# Patient Record
Sex: FEMALE | Race: BLACK OR AFRICAN AMERICAN | NOT HISPANIC OR LATINO | Employment: FULL TIME | URBAN - METROPOLITAN AREA
[De-identification: names, ages, dates, MRNs, and addresses within clinical notes are randomized per-mention and may not be internally consistent; named-entity substitution may affect disease eponyms.]

---

## 2023-05-05 LAB
ESTIMATED AVERAGE GLUCOSE FOR HBA1C: 140 MG/DL
HEMOGLOBIN A1C/HEMOGLOBIN TOTAL IN BLOOD: 6.5 %

## 2023-06-08 LAB
ABO GROUP (TYPE) IN BLOOD: NORMAL
ANTIBODY SCREEN: NORMAL
ERYTHROCYTE DISTRIBUTION WIDTH (RATIO) BY AUTOMATED COUNT: 13.4 % (ref 11.5–14.5)
ERYTHROCYTE MEAN CORPUSCULAR HEMOGLOBIN CONCENTRATION (G/DL) BY AUTOMATED: 31.5 G/DL (ref 32–36)
ERYTHROCYTE MEAN CORPUSCULAR VOLUME (FL) BY AUTOMATED COUNT: 85 FL (ref 80–100)
ERYTHROCYTES (10*6/UL) IN BLOOD BY AUTOMATED COUNT: 4.43 X10E12/L (ref 4–5.2)
HEMATOCRIT (%) IN BLOOD BY AUTOMATED COUNT: 37.8 % (ref 36–46)
HEMOGLOBIN (G/DL) IN BLOOD: 11.9 G/DL (ref 12–16)
LEUKOCYTES (10*3/UL) IN BLOOD BY AUTOMATED COUNT: 12 X10E9/L (ref 4.4–11.3)
NRBC (PER 100 WBCS) BY AUTOMATED COUNT: 0 /100 WBC (ref 0–0)
PLATELETS (10*3/UL) IN BLOOD AUTOMATED COUNT: 242 X10E9/L (ref 150–450)
REFLEX ADDED, ANEMIA PANEL: ABNORMAL
RH FACTOR: NORMAL
SYPHILIS TOTAL AB: NONREACTIVE

## 2023-07-13 LAB
ABO GROUP (TYPE) IN BLOOD: NORMAL
ALANINE AMINOTRANSFERASE (SGPT) (U/L) IN SER/PLAS: 7 U/L (ref 7–45)
ALBUMIN (G/DL) IN SER/PLAS: 4 G/DL (ref 3.4–5)
ALKALINE PHOSPHATASE (U/L) IN SER/PLAS: 113 U/L (ref 33–110)
ANION GAP IN SER/PLAS: 15 MMOL/L (ref 10–20)
ANTIBODY SCREEN: NORMAL
ASPARTATE AMINOTRANSFERASE (SGOT) (U/L) IN SER/PLAS: 9 U/L (ref 9–39)
BILIRUBIN TOTAL (MG/DL) IN SER/PLAS: 0.3 MG/DL (ref 0–1.2)
CALCIUM (MG/DL) IN SER/PLAS: 9.2 MG/DL (ref 8.6–10.6)
CARBON DIOXIDE, TOTAL (MMOL/L) IN SER/PLAS: 21 MMOL/L (ref 21–32)
CHLORIDE (MMOL/L) IN SER/PLAS: 104 MMOL/L (ref 98–107)
CREATININE (MG/DL) IN SER/PLAS: 0.58 MG/DL (ref 0.5–1.05)
GFR FEMALE: >90 ML/MIN/1.73M2
GLUCOSE (MG/DL) IN SER/PLAS: 115 MG/DL (ref 74–99)
POTASSIUM (MMOL/L) IN SER/PLAS: 4.1 MMOL/L (ref 3.5–5.3)
PROTEIN TOTAL: 6.9 G/DL (ref 6.4–8.2)
RH FACTOR: NORMAL
SODIUM (MMOL/L) IN SER/PLAS: 136 MMOL/L (ref 136–145)
UREA NITROGEN (MG/DL) IN SER/PLAS: 10 MG/DL (ref 6–23)

## 2023-07-14 LAB
ESTIMATED AVERAGE GLUCOSE FOR HBA1C: 169 MG/DL
HEMOGLOBIN A1C/HEMOGLOBIN TOTAL IN BLOOD: 7.5 %

## 2023-07-16 LAB
BB ANTIBODY IDENTIFICATION: NORMAL
GROUP B STREP SCREEN: ABNORMAL

## 2023-07-17 LAB — PATH REV-IMMUNOHEMATOLOGY-PR30: NORMAL

## 2023-08-23 ENCOUNTER — HOSPITAL ENCOUNTER (OUTPATIENT)
Dept: DATA CONVERSION | Facility: HOSPITAL | Age: 33
End: 2023-08-23
Attending: STUDENT IN AN ORGANIZED HEALTH CARE EDUCATION/TRAINING PROGRAM
Payer: COMMERCIAL

## 2023-08-23 DIAGNOSIS — R29.810 FACIAL WEAKNESS: ICD-10-CM

## 2023-08-23 DIAGNOSIS — E11.9 TYPE 2 DIABETES MELLITUS WITHOUT COMPLICATIONS (MULTI): ICD-10-CM

## 2023-08-23 DIAGNOSIS — Z79.84 LONG TERM (CURRENT) USE OF ORAL HYPOGLYCEMIC DRUGS: ICD-10-CM

## 2023-08-23 DIAGNOSIS — Z79.899 OTHER LONG TERM (CURRENT) DRUG THERAPY: ICD-10-CM

## 2023-08-23 DIAGNOSIS — G51.0 BELL'S PALSY: ICD-10-CM

## 2023-08-23 LAB
ALANINE AMINOTRANSFERASE (SGPT) (U/L) IN SER/PLAS: 20 U/L (ref 7–45)
ALBUMIN (G/DL) IN SER/PLAS: 4 G/DL (ref 3.4–5)
ALKALINE PHOSPHATASE (U/L) IN SER/PLAS: 107 U/L (ref 33–110)
ANION GAP IN SER/PLAS: 16 MMOL/L (ref 10–20)
ASPARTATE AMINOTRANSFERASE (SGOT) (U/L) IN SER/PLAS: 15 U/L (ref 9–39)
BILIRUBIN TOTAL (MG/DL) IN SER/PLAS: 0.3 MG/DL (ref 0–1.2)
CALCIUM (MG/DL) IN SER/PLAS: 9.2 MG/DL (ref 8.6–10.6)
CARBON DIOXIDE, TOTAL (MMOL/L) IN SER/PLAS: 22 MMOL/L (ref 21–32)
CHLORIDE (MMOL/L) IN SER/PLAS: 104 MMOL/L (ref 98–107)
CREATININE (MG/DL) IN SER/PLAS: 0.56 MG/DL (ref 0.5–1.05)
ERYTHROCYTE DISTRIBUTION WIDTH (RATIO) BY AUTOMATED COUNT: 15.6 % (ref 11.5–14.5)
ERYTHROCYTE MEAN CORPUSCULAR HEMOGLOBIN CONCENTRATION (G/DL) BY AUTOMATED: 30.4 G/DL (ref 32–36)
ERYTHROCYTE MEAN CORPUSCULAR VOLUME (FL) BY AUTOMATED COUNT: 85 FL (ref 80–100)
ERYTHROCYTES (10*6/UL) IN BLOOD BY AUTOMATED COUNT: 4.22 X10E12/L (ref 4–5.2)
GFR FEMALE: >90 ML/MIN/1.73M2
GLUCOSE (MG/DL) IN SER/PLAS: 170 MG/DL (ref 74–99)
HEMATOCRIT (%) IN BLOOD BY AUTOMATED COUNT: 35.9 % (ref 36–46)
HEMOGLOBIN (G/DL) IN BLOOD: 10.9 G/DL (ref 12–16)
LACTATE DEHYDROGENASE (U/L) IN SER/PLAS BY LAC->PYR RXN: 213 U/L (ref 84–246)
LEUKOCYTES (10*3/UL) IN BLOOD BY AUTOMATED COUNT: 11.7 X10E9/L (ref 4.4–11.3)
NRBC (PER 100 WBCS) BY AUTOMATED COUNT: 0 /100 WBC (ref 0–0)
PLATELETS (10*3/UL) IN BLOOD AUTOMATED COUNT: 300 X10E9/L (ref 150–450)
POCT GLUCOSE: 202 MG/DL (ref 74–99)
POTASSIUM (MMOL/L) IN SER/PLAS: 3.9 MMOL/L (ref 3.5–5.3)
PROTEIN TOTAL: 7.2 G/DL (ref 6.4–8.2)
SODIUM (MMOL/L) IN SER/PLAS: 138 MMOL/L (ref 136–145)
URATE (MG/DL) IN SER/PLAS: 4.7 MG/DL (ref 2.3–6.7)
UREA NITROGEN (MG/DL) IN SER/PLAS: 8 MG/DL (ref 6–23)

## 2023-08-29 LAB
B. BURGDORFERI VLSE1/PEPC10 ABS, ELISA: 0.95 IV
BORRELIA BURGDORFERI AB, IGG BY ELISA: 0.17 IV
BORRELIA BURGDORFERI AB, IGM BY ELISA: 0.34 IV
LYME MODIFIED 2-TIER TESTING INTERPRETATION: NEGATIVE

## 2023-09-28 PROBLEM — B37.31 VAGINAL YEAST INFECTION: Status: ACTIVE | Noted: 2023-09-28

## 2023-09-28 PROBLEM — N92.6 MISSED PERIOD: Status: ACTIVE | Noted: 2023-09-28

## 2023-09-28 PROBLEM — O35.9XX0 SUSPECTED FETAL ANOMALY, ANTEPARTUM (HHS-HCC): Status: ACTIVE | Noted: 2023-09-28

## 2023-09-28 PROBLEM — R03.0 ELEVATED BLOOD PRESSURE READING: Status: ACTIVE | Noted: 2023-09-28

## 2023-09-28 PROBLEM — R03.0 BLOOD PRESSURE ELEVATED WITHOUT HISTORY OF HTN: Status: ACTIVE | Noted: 2023-09-28

## 2023-09-28 PROBLEM — O24.119 PRE-EXISTING TYPE 2 DIABETES MELLITUS DURING PREGNANCY, ANTEPARTUM (HHS-HCC): Status: ACTIVE | Noted: 2023-09-28

## 2023-09-28 PROBLEM — R87.619 ABNORMAL PAP SMEAR OF CERVIX: Status: ACTIVE | Noted: 2023-09-28

## 2023-09-28 PROBLEM — O10.919 CHRONIC HYPERTENSION IN PREGNANCY (HHS-HCC): Status: ACTIVE | Noted: 2023-09-28

## 2023-09-28 PROBLEM — O10.919 MATERNAL CHRONIC HYPERTENSION (HHS-HCC): Status: ACTIVE | Noted: 2023-09-28

## 2023-09-28 PROBLEM — I10 BENIGN ESSENTIAL HYPERTENSION: Status: ACTIVE | Noted: 2023-09-28

## 2023-09-28 PROBLEM — H52.13 MYOPIA OF BOTH EYES: Status: ACTIVE | Noted: 2023-09-28

## 2023-09-28 PROBLEM — O24.912: Status: ACTIVE | Noted: 2023-09-28

## 2023-09-28 PROBLEM — R10.32 LEFT LOWER QUADRANT PAIN: Status: ACTIVE | Noted: 2023-09-28

## 2023-09-28 PROBLEM — G51.0 BELL'S PALSY: Status: ACTIVE | Noted: 2023-09-28

## 2023-09-28 PROBLEM — N89.8 VAGINAL DISCHARGE: Status: ACTIVE | Noted: 2023-09-28

## 2023-09-28 PROBLEM — J45.909 ASTHMA (HHS-HCC): Status: ACTIVE | Noted: 2023-09-28

## 2023-09-28 PROBLEM — O09.90 HIGH RISK PREGNANCY, ANTEPARTUM (HHS-HCC): Status: ACTIVE | Noted: 2023-09-28

## 2023-09-28 PROBLEM — L73.9 FOLLICULITIS: Status: ACTIVE | Noted: 2023-09-28

## 2023-09-28 PROBLEM — M67.40 GANGLION: Status: ACTIVE | Noted: 2023-09-28

## 2023-09-28 PROBLEM — O35.BXX0 ABNORMAL FETAL ECHOCARDIOGRAM AFFECTING ANTEPARTUM CARE OF MOTHER (HHS-HCC): Status: ACTIVE | Noted: 2023-09-28

## 2023-09-28 PROBLEM — D50.9 IRON DEFICIENCY ANEMIA: Status: ACTIVE | Noted: 2023-09-28

## 2023-09-28 RX ORDER — NIFEDIPINE 60 MG/1
TABLET, EXTENDED RELEASE ORAL
COMMUNITY
Start: 2023-08-18 | End: 2023-12-20

## 2023-09-28 RX ORDER — HYDROCHLOROTHIAZIDE 25 MG/1
1 TABLET ORAL DAILY
COMMUNITY
Start: 2021-11-17 | End: 2023-12-20 | Stop reason: ALTCHOICE

## 2023-09-28 RX ORDER — METFORMIN HYDROCHLORIDE 500 MG/1
TABLET ORAL
COMMUNITY
Start: 2023-08-18 | End: 2023-12-20

## 2023-09-28 RX ORDER — IBUPROFEN 600 MG/1
TABLET ORAL
COMMUNITY
Start: 2020-09-14 | End: 2023-11-27

## 2023-09-28 RX ORDER — INSULIN GLARGINE 100 [IU]/ML
12 INJECTION, SOLUTION SUBCUTANEOUS 2 TIMES DAILY
COMMUNITY
End: 2024-05-21 | Stop reason: SDUPTHER

## 2023-09-28 RX ORDER — PRENATAL VIT,CAL 76/IRON/FOLIC 29 MG-1 MG
1 TABLET ORAL DAILY
COMMUNITY
Start: 2023-01-24 | End: 2023-12-20

## 2023-09-28 RX ORDER — NAPROXEN SODIUM 220 MG/1
TABLET, FILM COATED ORAL
COMMUNITY
Start: 2023-02-01 | End: 2023-12-20

## 2023-09-28 RX ORDER — PNV NO.95/FERROUS FUM/FOLIC AC 28MG-0.8MG
TABLET ORAL
COMMUNITY
Start: 2023-01-24 | End: 2023-12-20

## 2023-09-28 RX ORDER — INSULIN LISPRO 100 [IU]/ML
INJECTION, SOLUTION INTRAVENOUS; SUBCUTANEOUS
COMMUNITY
End: 2023-12-20

## 2023-09-28 RX ORDER — PYRIDOXINE HCL (VITAMIN B6) 25 MG
TABLET ORAL EVERY 8 HOURS
COMMUNITY
Start: 2023-02-22 | End: 2023-12-20

## 2023-09-28 RX ORDER — DIPHENHYDRAMINE HCL 25 MG
CAPSULE ORAL
COMMUNITY
Start: 2023-02-22 | End: 2023-12-20

## 2023-09-28 RX ORDER — IBUPROFEN 200 MG
CAPSULE ORAL
COMMUNITY
Start: 2020-02-13

## 2023-09-28 RX ORDER — FERROUS SULFATE 325(65) MG
1 TABLET ORAL DAILY
COMMUNITY
Start: 2023-09-21 | End: 2023-12-20

## 2023-09-28 RX ORDER — NIFEDIPINE 30 MG/1
1 TABLET, FILM COATED, EXTENDED RELEASE ORAL DAILY
COMMUNITY
Start: 2020-09-14 | End: 2023-12-20

## 2023-09-28 RX ORDER — LANCETS 28 GAUGE
EACH MISCELLANEOUS
COMMUNITY
Start: 2023-07-16

## 2023-09-28 RX ORDER — ACETAMINOPHEN 325 MG/1
TABLET ORAL
COMMUNITY
Start: 2023-08-18 | End: 2023-11-27

## 2023-09-28 RX ORDER — PREDNISONE 50 MG/1
TABLET ORAL
COMMUNITY
Start: 2023-08-23 | End: 2023-12-20

## 2023-09-29 VITALS — WEIGHT: 216.27 LBS | HEIGHT: 65 IN | BODY MASS INDEX: 36.03 KG/M2

## 2023-09-30 NOTE — PROGRESS NOTES
Current Stage:   Stage: Triage     Subjective Data:   Post Partum:  Ambulate: Yes   Flatus: Yes   Tolerate Diet: Yes   Lochia: Light   Desired Contraception: declined - Contraception  options reviewed, including risks and benefits.   Postpartum:    31 yo now , PPD7 s/p  at 37.3 , presents with elevated BP at home and left facial paralysis.    Pt reports a headache last night for which she took two baby ASA. Her BP this am was 162/101 and she noticed left facial numbness that she first noticed around 8 am. She was up with the baby several times through the night and did not notice the facial  numbness at 4 am which is the last time she was up until 8 am.  She denies HA, scotoma, RUQ pain, chest pain, SOB, any change in gait, difficulty swallowing. Other than left ear pain for the past few days, she denies any other sick symptoms and no sick  contacts. She took her Nifed 60 last night between 7-8 pm. She denies any increased pain or bleeding from delivery.     Pregnancy complicated by:  - cHTN, on Nifed 60, P:C 0.06, HELLP labs negx1  - h/o sPEC with IOL at 35wga  - T2DM, A1c 7.5 on . normal fetal echo  - Asthma in childhood, no albuterol use or exacerbations in adulthood  - GBS pos  - Rh neg, s/p rhogam at 28 wks  - Rubella nonimmune, will need MMR postpartum  - FGR. EFW 2442g 9%, AC 19% on 8/10      OBHx:  2020: 35.4wga IOL for SPEC.  5#13  2014: term . GDM  GynHx:   pap  ASC-US HPV+  colpo  WNL  last pap 3/2019 WNL  2022 LSIL +OTHER  PMH: anxiety, no meds  PSH: denies  Meds: Nifedipine 60 daily, Metformin 1000 BID  All: NKDA  FamHx: DM- mother. States mother had CVA in pregnancy. MGM - HTN  SocHx: denies t/e/d. Lives with  and children        Objective Information:    Objective Information:      T   P  R  BP   MAP  SpO2   Value  36.7  101  17  147/81   108  98%  Date/Time  10:05  10:48  10:05  10:44   10:44  10:48  Range  (36.7C - 36.7C )  (99 - 115  )  (17 - 17 )  (139 - 147 )/ (77 - 81 )  (100 - 108 )  (97% - 100% )      Pain reported at  10:05: 0 = None      Physical Exam:   Constitutional: alert, oriented x3, conversational   Obstetric: soft,  fundus firm below umbilicus; light  rubra   Eyes: Sclera white, EOM intact, no discharge or erythema  PERRLA   ENMT: No hearing deficit, no goiter present   Head/Neck: Normocephalic, atraumatic, full range  of motion intact   Respiratory/Thorax: normal respiratory effort   Gastrointestinal: soft, appropriately tender, + flatus   Musculoskeletal: Grossly WNL   Extremities: No edema, discoloration, or pain in  BLE   Neurological: Speech clear, SALGADO, equal strength all  extremities, noted left facial droop, able to raise eyebrows, distorted smile, tongue midline, no sensation deficits   Psychological: Appropriate mood, behavior. Calm,  cooperative.   Skin: no rashes or lesions     Assessment and Plan:   Assessment:    33 yo now , PPD7 s/p  at 37.3 , presents with elevated BP at home and left facial paralysis.    Bell's Palsy  - BAT called to r/o stroke, canceled per team  - Neuro c/s recs appreciated  - [facility]  - Lyme antibody added per recommendation  - discussed warning signs, when to return    cHTN, r/o siPEC  - BPs elevated at home  - BPs cycled in triage and have been mild range to normotensive  - No symptoms of severe features (headache, scotoma, RUQ pain, chest pain, shortness of breath)  - HELLP labs neg  - contine Nifed 60 daily  - has BP cuff at home  - discussed BP parameters, PEC s/s    T2DM  - POCT 202  - ordered am Metformin dose  - glucose on , no anion gap    Maternal Well-being  - Emotional support and reassurance provided  - All questions and concerns addressed    Postpartum  - routine postpartum care  - tylenol/ibuprofen as needed    Dispo: Home with precautions. Follow up within one week- pt to [facility]    Staffed with Dr. Lucy Martinez,  MSN, APRN, NP-BC        Plan of Care Reviewed With:  Plan of Care Reviewed With: patient     Attestation:   Note Completion:  I am a:  Advanced Practice Provider   Attending Only - Shared Visit with Advanced Practice Provider This is a shared visit.  I have reviewed the Advanced Practice Provider?s encounter note, approve the Advanced Practice Provider?s documentation,  and provide the following additional information from my personal encounter.    Comments/ Additional Findings    I have seen and examined the patient and agree with the assessment and plan as documented by the NP.  Mild left sided Bell's palsy-- appreciate neurology  recommendations.  OK for discharge home.          Electronic Signatures:  Palma Martinez (APRN-CNP)  (Signed 23-Aug-2023 12:27)   Authored: Current Stage, Subjective Data, Objective Data,  Assessment and Plan, Note Completion  Preet Ayala)  (Signed 23-Aug-2023 13:44)   Authored: Note Completion   Co-Signer: Subjective Data, Objective Data, Assessment and Plan, Note Completion      Last Updated: 23-Aug-2023 13:44 by Preet Ayala)

## 2023-10-04 ENCOUNTER — APPOINTMENT (OUTPATIENT)
Dept: NEUROLOGY | Facility: HOSPITAL | Age: 33
End: 2023-10-04
Payer: COMMERCIAL

## 2023-10-04 ENCOUNTER — OFFICE VISIT (OUTPATIENT)
Dept: NEUROLOGY | Facility: HOSPITAL | Age: 33
End: 2023-10-04
Payer: COMMERCIAL

## 2023-10-04 VITALS
HEART RATE: 93 BPM | WEIGHT: 215 LBS | BODY MASS INDEX: 35.82 KG/M2 | SYSTOLIC BLOOD PRESSURE: 149 MMHG | HEIGHT: 65 IN | RESPIRATION RATE: 18 BRPM | TEMPERATURE: 97.6 F | DIASTOLIC BLOOD PRESSURE: 95 MMHG

## 2023-10-04 DIAGNOSIS — G51.0 BELL'S PALSY: Primary | ICD-10-CM

## 2023-10-04 PROCEDURE — 99203 OFFICE O/P NEW LOW 30 MIN: CPT | Performed by: STUDENT IN AN ORGANIZED HEALTH CARE EDUCATION/TRAINING PROGRAM

## 2023-10-04 PROCEDURE — 99213 OFFICE O/P EST LOW 20 MIN: CPT | Mod: GC | Performed by: STUDENT IN AN ORGANIZED HEALTH CARE EDUCATION/TRAINING PROGRAM

## 2023-10-04 ASSESSMENT — PAIN SCALES - GENERAL: PAINLEVEL: 0-NO PAIN

## 2023-10-04 NOTE — PROGRESS NOTES
Subjective   Nadeen Dia is a 33 y.o.   female.  HPI  She developed a left sided facial droop about 5 weeks ago, and it was 1 week after giving birth to her healthy baby daughter. At the time, the facial palsy was associated with a severe headache on the left side of her head, but she is no longer having headaches. She went to the emergency department and was diagnosed with Bell's palsy. She was given a 7 day course of prednisone, which she completed. She reports that she has had significant improvement, however her smile is still not entirely back to her baseline. She still notices some very mild asymmetry on the left side of her lower face when she smiles. She reports that she also has occasional ringing in her left ear which is intermittent. She does report some dry eye but has no difficulty with closing her left eye at this time and denies any soap/water getting in her eyes when she showers. She was not given an eye patch or eye drops at the time of her diagnosis because it was not deemed to be necessary due to the degree of the palsy.    ROS: A 14 point review of systems was obtained and was negative unless stated otherwise.     PMHx:  - HTN  - Gestational diabetes    PSHx:  - no prior surgeries    FamHx:  - Father  of heart attack (35)  - Grandmother (lung cancer)    SocHx:  - lives at home with  and 3 children  - works as an   - occasional drinker, non-smoker, denies illicit drug use    Medications:  - nifedipine 60mg daily   - insulin  - prenatal vitamins    Objective   Neurological Exam  Mental Status  Awake. Oriented to person, place and time. no dysarthria present. Language is fluent with no aphasia. Attention and concentration are normal. Fund of knowledge is appropriate for level of education.    Cranial Nerves  CN II: Visual fields full to confrontation.  CN V: Facial sensation is normal.  CN VII:  Left: There is peripheral facial weakness.  CN VIII: Hearing is normal.  CN  IX, X: Palate elevates symmetrically  CN XI: Shoulder shrug strength is normal.  CN XII: Tongue midline without atrophy or fasciculations.    Motor  Normal muscle bulk throughout. No fasciculations present. Normal muscle tone. No abnormal involuntary movements. Strength is 5/5 throughout all four extremities.    Sensory  Light touch is normal in upper and lower extremities.     Reflexes                                            Right                      Left  Biceps                                 1+                         1+  Patellar                                1+                         1+  Achilles                                1+                         1+    Coordination  Right: Finger-to-nose normal. Rapid alternating movement normal. Heel-to-shin normal.Left: Finger-to-nose normal. Rapid alternating movement normal. Heel-to-shin normal.    Gait  Casual gait is normal including stance, stride, and arm swing.    Physical Exam  Constitutional:       General: She is awake.   Neurological:      Cranial Nerves: No dysarthria.      Motor: Motor strength is normal.     Deep Tendon Reflexes:      Reflex Scores:       Bicep reflexes are 1+ on the right side and 1+ on the left side.       Patellar reflexes are 1+ on the right side and 1+ on the left side.       Achilles reflexes are 1+ on the right side and 1+ on the left side.      Assessment/Plan   Ms. Dia is a 33 year old female who presents for follow up for bell's palsy. She initially presented with left sided facial droop 5 weeks ago and was given a 7 day course of prednisone which has been completed. She is near her baseline with still some residual facial weakness. She has good strength with eye closure and does not appear to need any eye patch for protection at night.    Impression: Improving bell's palsy    Plan:  - No need for any further workup or treatment. No need to follow up. Can call with any questions or concerns    Ta Vaughn MD

## 2023-10-04 NOTE — PATIENT INSTRUCTIONS
Ms. Dia,     Thank you for coming to visit us today. You came to our clinic due to Bell's palsy. We are happy to see that you have had some improvement of your symptoms. We expect that with time the facial weakness should improve to your baseline or very near to it.     You do not need to follow up with us. Please do not hesitate to reach back out with any additional questions or concerns.

## 2023-11-27 ENCOUNTER — HOSPITAL ENCOUNTER (EMERGENCY)
Facility: HOSPITAL | Age: 33
Discharge: HOME | End: 2023-11-28
Attending: EMERGENCY MEDICINE
Payer: COMMERCIAL

## 2023-11-27 DIAGNOSIS — N61.0 MASTITIS, LEFT, ACUTE: Primary | ICD-10-CM

## 2023-11-27 LAB
ALBUMIN SERPL BCP-MCNC: 4.4 G/DL (ref 3.4–5)
ALP SERPL-CCNC: 116 U/L (ref 33–110)
ALT SERPL W P-5'-P-CCNC: 13 U/L (ref 7–45)
ANION GAP SERPL CALC-SCNC: 14 MMOL/L (ref 10–20)
AST SERPL W P-5'-P-CCNC: 10 U/L (ref 9–39)
BASOPHILS # BLD AUTO: 0.03 X10*3/UL (ref 0–0.1)
BASOPHILS NFR BLD AUTO: 0.4 %
BILIRUB SERPL-MCNC: 0.2 MG/DL (ref 0–1.2)
BUN SERPL-MCNC: 10 MG/DL (ref 6–23)
CALCIUM SERPL-MCNC: 9.1 MG/DL (ref 8.6–10.6)
CHLORIDE SERPL-SCNC: 103 MMOL/L (ref 98–107)
CO2 SERPL-SCNC: 20 MMOL/L (ref 21–32)
CREAT SERPL-MCNC: 0.8 MG/DL (ref 0.5–1.05)
EOSINOPHIL # BLD AUTO: 0.1 X10*3/UL (ref 0–0.7)
EOSINOPHIL NFR BLD AUTO: 1.5 %
ERYTHROCYTE [DISTWIDTH] IN BLOOD BY AUTOMATED COUNT: 14.2 % (ref 11.5–14.5)
GFR SERPL CREATININE-BSD FRML MDRD: >90 ML/MIN/1.73M*2
GLUCOSE SERPL-MCNC: 315 MG/DL (ref 74–99)
HCT VFR BLD AUTO: 35.9 % (ref 36–46)
HGB BLD-MCNC: 11.3 G/DL (ref 12–16)
IMM GRANULOCYTES # BLD AUTO: 0.02 X10*3/UL (ref 0–0.7)
IMM GRANULOCYTES NFR BLD AUTO: 0.3 % (ref 0–0.9)
LYMPHOCYTES # BLD AUTO: 2.6 X10*3/UL (ref 1.2–4.8)
LYMPHOCYTES NFR BLD AUTO: 37.8 %
MCH RBC QN AUTO: 25.3 PG (ref 26–34)
MCHC RBC AUTO-ENTMCNC: 31.5 G/DL (ref 32–36)
MCV RBC AUTO: 80 FL (ref 80–100)
MONOCYTES # BLD AUTO: 0.51 X10*3/UL (ref 0.1–1)
MONOCYTES NFR BLD AUTO: 7.4 %
NEUTROPHILS # BLD AUTO: 3.62 X10*3/UL (ref 1.2–7.7)
NEUTROPHILS NFR BLD AUTO: 52.6 %
NRBC BLD-RTO: 0 /100 WBCS (ref 0–0)
PLATELET # BLD AUTO: 276 X10*3/UL (ref 150–450)
POTASSIUM SERPL-SCNC: 4 MMOL/L (ref 3.5–5.3)
PREGNANCY TEST URINE, POC: NEGATIVE
PROT SERPL-MCNC: 7.3 G/DL (ref 6.4–8.2)
RBC # BLD AUTO: 4.47 X10*6/UL (ref 4–5.2)
SODIUM SERPL-SCNC: 133 MMOL/L (ref 136–145)
WBC # BLD AUTO: 6.9 X10*3/UL (ref 4.4–11.3)

## 2023-11-27 PROCEDURE — 99284 EMERGENCY DEPT VISIT MOD MDM: CPT | Performed by: EMERGENCY MEDICINE

## 2023-11-27 PROCEDURE — 85025 COMPLETE CBC W/AUTO DIFF WBC: CPT | Performed by: EMERGENCY MEDICINE

## 2023-11-27 PROCEDURE — 2500000001 HC RX 250 WO HCPCS SELF ADMINISTERED DRUGS (ALT 637 FOR MEDICARE OP): Mod: SE

## 2023-11-27 PROCEDURE — 81025 URINE PREGNANCY TEST: CPT

## 2023-11-27 PROCEDURE — 80053 COMPREHEN METABOLIC PANEL: CPT | Performed by: EMERGENCY MEDICINE

## 2023-11-27 PROCEDURE — 99283 EMERGENCY DEPT VISIT LOW MDM: CPT | Mod: 25

## 2023-11-27 PROCEDURE — 2500000004 HC RX 250 GENERAL PHARMACY W/ HCPCS (ALT 636 FOR OP/ED): Mod: SE

## 2023-11-27 PROCEDURE — 36415 COLL VENOUS BLD VENIPUNCTURE: CPT | Performed by: EMERGENCY MEDICINE

## 2023-11-27 PROCEDURE — 96374 THER/PROPH/DIAG INJ IV PUSH: CPT

## 2023-11-27 RX ORDER — IBUPROFEN 600 MG/1
600 TABLET ORAL EVERY 6 HOURS PRN
Qty: 28 TABLET | Refills: 0 | Status: SHIPPED | OUTPATIENT
Start: 2023-11-27 | End: 2023-12-04

## 2023-11-27 RX ORDER — ACETAMINOPHEN 500 MG
1000 TABLET ORAL EVERY 6 HOURS PRN
Qty: 30 TABLET | Refills: 0 | Status: SHIPPED | OUTPATIENT
Start: 2023-11-27 | End: 2023-11-27 | Stop reason: SDUPTHER

## 2023-11-27 RX ORDER — KETOROLAC TROMETHAMINE 15 MG/ML
15 INJECTION, SOLUTION INTRAMUSCULAR; INTRAVENOUS ONCE
Status: COMPLETED | OUTPATIENT
Start: 2023-11-27 | End: 2023-11-27

## 2023-11-27 RX ORDER — CLINDAMYCIN HYDROCHLORIDE 150 MG/1
450 CAPSULE ORAL 3 TIMES DAILY
Qty: 90 CAPSULE | Refills: 0 | Status: SHIPPED | OUTPATIENT
Start: 2023-11-27 | End: 2023-11-27 | Stop reason: SDUPTHER

## 2023-11-27 RX ORDER — IBUPROFEN 600 MG/1
600 TABLET ORAL EVERY 6 HOURS PRN
Qty: 28 TABLET | Refills: 0 | Status: SHIPPED | OUTPATIENT
Start: 2023-11-27 | End: 2023-11-27 | Stop reason: SDUPTHER

## 2023-11-27 RX ORDER — ACETAMINOPHEN 325 MG/1
650 TABLET ORAL ONCE
Status: COMPLETED | OUTPATIENT
Start: 2023-11-27 | End: 2023-11-27

## 2023-11-27 RX ORDER — CLINDAMYCIN HYDROCHLORIDE 300 MG/1
CAPSULE ORAL
Status: DISCONTINUED
Start: 2023-11-27 | End: 2023-11-28 | Stop reason: HOSPADM

## 2023-11-27 RX ORDER — CLINDAMYCIN HYDROCHLORIDE 150 MG/1
450 CAPSULE ORAL 3 TIMES DAILY
Qty: 90 CAPSULE | Refills: 0 | Status: SHIPPED | OUTPATIENT
Start: 2023-11-27 | End: 2023-12-07

## 2023-11-27 RX ORDER — ACETAMINOPHEN 500 MG
1000 TABLET ORAL EVERY 6 HOURS PRN
Qty: 30 TABLET | Refills: 0 | Status: SHIPPED | OUTPATIENT
Start: 2023-11-27 | End: 2023-12-07

## 2023-11-27 RX ADMIN — CLINDAMYCIN HYDROCHLORIDE 450 MG: 150 CAPSULE ORAL at 23:25

## 2023-11-27 RX ADMIN — KETOROLAC TROMETHAMINE 15 MG: 15 INJECTION, SOLUTION INTRAMUSCULAR; INTRAVENOUS at 23:25

## 2023-11-27 RX ADMIN — ACETAMINOPHEN 650 MG: 325 TABLET ORAL at 23:25

## 2023-11-27 ASSESSMENT — COLUMBIA-SUICIDE SEVERITY RATING SCALE - C-SSRS
2. HAVE YOU ACTUALLY HAD ANY THOUGHTS OF KILLING YOURSELF?: NO
6. HAVE YOU EVER DONE ANYTHING, STARTED TO DO ANYTHING, OR PREPARED TO DO ANYTHING TO END YOUR LIFE?: NO
1. IN THE PAST MONTH, HAVE YOU WISHED YOU WERE DEAD OR WISHED YOU COULD GO TO SLEEP AND NOT WAKE UP?: NO

## 2023-11-27 ASSESSMENT — PAIN DESCRIPTION - DESCRIPTORS: DESCRIPTORS: ACHING;SHARP

## 2023-11-27 NOTE — Clinical Note
Naeden Dia was seen and treated in our emergency department on 11/27/2023.  She may return to work on 11/28/2023.       If you have any questions or concerns, please don't hesitate to call.      Kimmy Hernandez, DO

## 2023-11-27 NOTE — Clinical Note
Nadeen Dia was seen and treated in our emergency department on 11/27/2023.  She may return to work on 11/28/2023.       If you have any questions or concerns, please don't hesitate to call.      Kimmy Hernandez, DO

## 2023-11-28 ENCOUNTER — ANCILLARY PROCEDURE (OUTPATIENT)
Dept: EMERGENCY MEDICINE | Facility: HOSPITAL | Age: 33
End: 2023-11-28
Payer: COMMERCIAL

## 2023-11-28 VITALS
DIASTOLIC BLOOD PRESSURE: 95 MMHG | TEMPERATURE: 98.1 F | OXYGEN SATURATION: 98 % | HEART RATE: 86 BPM | HEIGHT: 65 IN | BODY MASS INDEX: 33.49 KG/M2 | RESPIRATION RATE: 18 BRPM | SYSTOLIC BLOOD PRESSURE: 144 MMHG | WEIGHT: 201 LBS

## 2023-11-28 LAB
ATRIAL RATE: 84 BPM
P AXIS: 64 DEGREES
P OFFSET: 196 MS
P ONSET: 153 MS
PR INTERVAL: 132 MS
Q ONSET: 219 MS
QRS COUNT: 14 BEATS
QRS DURATION: 80 MS
QT INTERVAL: 360 MS
QTC CALCULATION(BAZETT): 425 MS
QTC FREDERICIA: 402 MS
R AXIS: 59 DEGREES
T AXIS: 8 DEGREES
T OFFSET: 399 MS
VENTRICULAR RATE: 84 BPM

## 2023-11-28 PROCEDURE — 93005 ELECTROCARDIOGRAM TRACING: CPT

## 2023-11-28 NOTE — ED PROVIDER NOTES
HPI   Chief Complaint   Patient presents with    Chest Pain       33-year-old female asthma hypertension, SILVANA, Bell's Palsy presenting to the ED with L breast pain x5 days.  Patient states she was seen at [facility] 2 days previously and was diagnosed with mastitis, has been on Bactrim without improvement in symptoms.  Patient continuing to endorse left breast tenderness and lymph node swelling in her left axillary region.  Patient taking Motrin for pain but continues to have severe symptoms.  Patient denies any trauma, is no longer breast-feeding is postpartum with vaginal delivery date 8/16/2023.  Patient denies any purulent drainage or abscess formation, no overlying skin changes to the breast.  Additionally denies shortness of breath, pain with exertion, history of DVT/PE.  Patient is not on any contraception.                          No data recorded                Patient History   Past Medical History:   Diagnosis Date    15 weeks gestation of pregnancy 01/22/2021    15 weeks gestation of pregnancy    19 weeks gestation of pregnancy 01/22/2021    19 weeks gestation of pregnancy    23 weeks gestation of pregnancy 01/22/2021    23 weeks gestation of pregnancy    Cramp and spasm     Leg cramps    Encounter for supervision of other normal pregnancy, unspecified trimester 01/22/2021    Prenatal care, subsequent pregnancy    Encounter for surveillance of injectable contraceptive 04/08/2015    Surveillance for Depo-Provera contraception    Exercise induced bronchospasm     Exercise-induced asthma    Gestational (pregnancy-induced) hypertension without significant proteinuria, unspecified trimester 09/24/2014    Gestational hypertension    High grade squamous intraepithelial lesion on cytologic smear of cervix (HGSIL) 12/03/2015    HSIL (high grade squamous intraepithelial lesion) on Pap smear of cervix    Maternal care for other (suspected) fetal abnormality and damage, fetal cardiac anomalies, not applicable or  unspecified 01/22/2021    Abnormal fetal echocardiogram affecting antepartum care of mother    Other specified diseases and conditions complicating pregnancy 09/24/2014    Back pain complicating pregnancy    Other specified pregnancy related conditions, unspecified trimester 01/22/2021    Rh negative, antepartum    Personal history of gestational diabetes 01/22/2021    History of gestational diabetes mellitus (GDM)    Personal history of other diseases of the musculoskeletal system and connective tissue     History of low back pain    Personal history of other endocrine, nutritional and metabolic disease 10/16/2020    History of type 2 diabetes mellitus    Pre-existing type 2 diabetes mellitus, in pregnancy, first trimester 01/22/2021    Pregnancy with type 2 diabetes mellitus in first trimester    Pre-existing type 2 diabetes mellitus, in pregnancy, second trimester 01/22/2021    Pregnancy with type 2 diabetes mellitus in second trimester    Pre-existing type 2 diabetes mellitus, in pregnancy, third trimester 08/09/2020    Pregnancy with type 2 diabetes mellitus in third trimester    Pre-existing type 2 diabetes mellitus, in pregnancy, unspecified trimester 01/22/2021    Pre-existing type 2 diabetes mellitus during pregnancy, antepartum    Pregnancy with inconclusive fetal viability, not applicable or unspecified 01/22/2021    Pregnancy of unknown anatomic location    Type O blood, Rh negative     Blood type O-    Unspecified maternal hypertension, third trimester 01/22/2021    Elevated blood pressure complicating pregnancy, antepartum, third trimester    Unspecified pre-existing hypertension complicating pregnancy, second trimester 01/22/2021    Maternal chronic hypertension in second trimester    Unspecified pre-existing hypertension complicating pregnancy, unspecified trimester 01/22/2021    Chronic hypertension in pregnancy     No past surgical history on file.  Family History   Problem Relation Name Age of  Onset    Asthma Mother      Other (Cerebral infarction) Mother      Heart attack Father      Diabetes Maternal Grandmother      Lung cancer Maternal Grandmother       Social History     Tobacco Use    Smoking status: Not on file    Smokeless tobacco: Not on file   Substance Use Topics    Alcohol use: Not on file    Drug use: Not on file       Physical Exam   ED Triage Vitals [11/27/23 2112]   Temp Heart Rate Resp BP   36.8 °C (98.3 °F) 95 18 (!) 169/113      SpO2 Temp Source Heart Rate Source Patient Position   98 % Tympanic Monitor Sitting      BP Location FiO2 (%)     Right arm --       Physical Exam  Vitals and nursing note reviewed.   Constitutional:       General: She is not in acute distress.     Appearance: She is well-developed. She is not ill-appearing.   HENT:      Head: Normocephalic and atraumatic.   Eyes:      Conjunctiva/sclera: Conjunctivae normal.   Cardiovascular:      Rate and Rhythm: Normal rate and regular rhythm.      Heart sounds: No murmur heard.  Pulmonary:      Effort: Pulmonary effort is normal. No respiratory distress.      Breath sounds: Normal breath sounds.   Chest:      Chest wall: Tenderness present.   Breasts:     Right: Normal.      Left: Tenderness (pain along supero-medial portion L breast) present. No swelling, mass, nipple discharge or skin change.   Abdominal:      Palpations: Abdomen is soft.      Tenderness: There is no abdominal tenderness.   Musculoskeletal:         General: No swelling. Normal range of motion.      Cervical back: Normal range of motion and neck supple.      Right lower leg: No tenderness. No edema.      Left lower leg: No tenderness. No edema.   Lymphadenopathy:      Upper Body:      Right upper body: No axillary or pectoral adenopathy.      Left upper body: Axillary adenopathy and pectoral adenopathy present.   Skin:     General: Skin is warm and dry.      Capillary Refill: Capillary refill takes less than 2 seconds.   Neurological:      General: No focal  deficit present.      Mental Status: She is alert and oriented to person, place, and time.   Psychiatric:         Mood and Affect: Mood normal.         ED Course & MDM   Diagnoses as of 11/27/23 6117   Mastitis, left, acute       Medical Decision Making  33-year-old female postpartum from 8/16/2023 delivery presenting with a left breast pain.  Patient seen at OSH, diagnosed with mastitis and was sent home on Bactrim however continues to have symptoms.  Patient afebrile, vital stable, hypertensive with history of hypertension noted.  Clinical exam notable for tenderness to palpation of the superomedial and inferior margin of the left breast without palpable mass or nodule.  Patient has axillary and pectoral adenopathy along the left side, there are no overlying skin changes, erythema or warmth, no drainage from the nipple.  Presentation consistent with suspected mastitis, given that patient has failed treatment with Bactrim we will change antibiotics to clindamycin.  Basic labs obtained as well and unremarkable.  No electrolyte derangements or leukocytosis.  PERC negative, no sob, tachycardia or sx concerning for PE, low risk for ACS. Also consider breast mass versus cyst or cancer as potential cause of symptoms however given acuity with lymphadenopathy suspicion more in line with infection.  Provided prescription for clindamycin and given discharge instructions to follow-up with gynecology.        Procedure  Procedures     Kimmy Hernandez DO  Resident  11/27/23 0015

## 2023-11-28 NOTE — DISCHARGE INSTRUCTIONS
You presented with left breast pain that has been ongoing for the last few days.  You were recently prescribed Bactrim for antibiotic coverage however you are continuing to have symptoms and pain so we changed her antibiotics to clindamycin.  Your prescription was sent to your pharmacy.  Should you continue to have worsening swelling, fever or progression of symptoms please return to the emergency department.  You should schedule follow-up with your gynecologist for further management should you continue to have pain within your left breast.

## 2023-11-28 NOTE — ED TRIAGE NOTES
JOLENE ALTAMIRANO is a 33y old F with a PMHx significant for pre-diabetes and HTN (on Nifedipine ER 60 mg 24 hr tab) who is presenting to [facility] ED with a chief concern for L sided chest pain that radiates to L breast and underneath L arm pit. Onset of symptoms began Friday without trauma, injury or any other known causes. Pt denies any associated numbness, tingling, diaphoretic/syncopal episodes. No associated SOB verbalized. Pt denies any current abdominal [ain, changes to bowel or bladder patterns. No recent sick contacts or COVID symptoms. NKDA    OF NOTE: Pt is a  who last had a vaginal delivery 2023.

## 2023-11-29 PROBLEM — N64.4 BREAST PAIN: Status: ACTIVE | Noted: 2023-11-29

## 2023-11-29 NOTE — PROGRESS NOTES
Nadeen Dia female   1990 33 y.o.   [id number]    Chief Complaint  New patient, left breast pain.    History Of Present Illness  Nadeen Dia is a very pleasant 33 y.o. AA female presenting to the breast center with left breast pain and swelling. She denies nipple discharge, fever or chills. She was treated at [facility] for mastitis and put on Bactrim without improvement. She is no longer breast feeding and delivered 23. She returned to  ER on 23 and started on Clindamycin for mastitis. She feels the pain and swelling has lessoned since starting the antibiotics. She denies breast surgery or biopsy. She has no family history of breast cancer.     REPRODUCTIVE HISTORY: menarche age 12, , first birth age 24,  x 3 months, no OCP's, premenopausal, LMP                            FAMILY CANCER HISTORY:   none     Review of Systems  Constitutional:  Negative for appetite change, fatigue, fever and unexpected weight change.   HENT:  Negative for ear pain, hearing loss, nosebleeds, sore throat and trouble swallowing.    Eyes:  Negative for discharge, itching and visual disturbance.   Breast: As stated in HPI.  Respiratory:  Negative for cough, chest tightness and shortness of breath.    Cardiovascular:  Negative for chest pain, palpitations and leg swelling.   Gastrointestinal:  Negative for abdominal pain, constipation, diarrhea and nausea.   Endocrine: Negative for cold intolerance and heat intolerance.   Genitourinary:  Negative for dysuria, frequency, hematuria, pelvic pain and vaginal bleeding.   Musculoskeletal:  Negative for arthralgias, back pain, gait problem, joint swelling and myalgias.   Skin:  Negative for color change and rash.   Allergic/Immunologic: Negative for environmental allergies and food allergies.   Neurological:  Negative for dizziness, tremors, speech difficulty, weakness, numbness and headaches.   Hematological:  Does not bruise/bleed easily.   Psychiatric/Behavioral:   Negative for agitation, dysphoric mood and sleep disturbance. The patient is not nervous/anxious.      Past Medical History  She has a past medical history of 15 weeks gestation of pregnancy (01/22/2021), 19 weeks gestation of pregnancy (01/22/2021), 23 weeks gestation of pregnancy (01/22/2021), Cramp and spasm, Encounter for supervision of other normal pregnancy, unspecified trimester (01/22/2021), Encounter for surveillance of injectable contraceptive (04/08/2015), Exercise induced bronchospasm, Gestational (pregnancy-induced) hypertension without significant proteinuria, unspecified trimester (09/24/2014), High grade squamous intraepithelial lesion on cytologic smear of cervix (HGSIL) (12/03/2015), Maternal care for other (suspected) fetal abnormality and damage, fetal cardiac anomalies, not applicable or unspecified (01/22/2021), Other specified diseases and conditions complicating pregnancy (09/24/2014), Other specified pregnancy related conditions, unspecified trimester (01/22/2021), Personal history of gestational diabetes (01/22/2021), Personal history of other diseases of the musculoskeletal system and connective tissue, Personal history of other endocrine, nutritional and metabolic disease (10/16/2020), Pre-existing type 2 diabetes mellitus, in pregnancy, first trimester (01/22/2021), Pre-existing type 2 diabetes mellitus, in pregnancy, second trimester (01/22/2021), Pre-existing type 2 diabetes mellitus, in pregnancy, third trimester (08/09/2020), Pre-existing type 2 diabetes mellitus, in pregnancy, unspecified trimester (01/22/2021), Pregnancy with inconclusive fetal viability, not applicable or unspecified (01/22/2021), Type O blood, Rh negative, Unspecified maternal hypertension, third trimester (01/22/2021), Unspecified pre-existing hypertension complicating pregnancy, second trimester (01/22/2021), and Unspecified pre-existing hypertension complicating pregnancy, unspecified trimester  (01/22/2021).    Surgical History  She has no past surgical history on file.    Family History  Cancer-related family history includes Lung cancer in her maternal grandmother.     Social History  Social History     Tobacco Use    Smoking status: Never    Smokeless tobacco: Never   Substance Use Topics    Alcohol use: Not on file      Allergies  Bee venom protein (honey bee)    Medications  Current Outpatient Medications   Medication Instructions    acetaminophen (TYLENOL) 1,000 mg, oral, Every 6 hours PRN    aspirin 81 mg chewable tablet oral    benzalkonium chloride 0.13 % towelette Take B/P every day as instructed.    blood sugar diagnostic (Blood Glucose Test) strip CHECK BLOOD GLUCOSE DOROTEO DAILY, ONCE IN THE MORNING AFTER GETTING UP ( BEFORE EATING ANYTHING) AND AGAIN 2 HRS AFTER DINNER.    clindamycin (CLEOCIN) 450 mg, oral, 3 times daily    diphenhydrAMINE (BENADryl) 25 mg capsule oral    ferrous sulfate 325 (65 Fe) MG tablet 1 tablet, oral, Daily    ferrous sulfate 325 (65 Fe) MG tablet TAKE 1 TABLET DAILY AS DIRECTED.    FreeStyle Lancets 28 gauge     hydroCHLOROthiazide (HYDRODiuril) 25 mg tablet 1 tablet, oral, Daily    ibuprofen 600 mg, oral, Every 6 hours PRN    insulin lispro (HumaLOG) 100 unit/mL injection ADMINISTER 12 UNITS UNDER THE SKIN BEFORE MEALS    Lantus Solostar U-100 Insulin 100 unit/mL (3 mL) pen INJECT 35 UNITS UNDER THE SKIN TWICE DAILY    metFORMIN (Glucophage) 500 mg tablet     metFORMIN (Glucophage) 500 mg tablet TAKE 1 TABLET BY MOUTH TWO TIMES A DAY WITH MEALS    NIFEdipine CC 30 mg 24 hr tablet 1 tablet, oral, Daily    NIFEdipine ER (Adalat CC) 60 mg 24 hr tablet TAKE 1 TABLET BY MOUTH ONCE DAILY    NIFEdipine XL 60 mg 24 hr tablet     predniSONE (Deltasone) 50 mg tablet     Prenatabs Rx 29 mg iron- 1 mg tablet 1 tablet, oral, Daily    prenatal vitamin, iron-folic, (Prenatal Multivitamins) 27 mg iron-800 mcg folic acid tablet oral    pyridoxine (Vitamin B-6) 25 mg tablet oral, Every  8 hours       Last Recorded Vitals  Blood pressure (!) 142/94, pulse 110, temperature 36.1 °C (97 °F), weight 93.8 kg (206 lb 11.2 oz).    Physical Exam  Patient is alert and oriented x3 and in a relaxed and appropriate mood. Her gait is steady and hand grasps are equal. Sclera is clear. The breasts are nearly symmetrical. The tissue is soft without palpable abnormalities, discrete nodules or masses.   Slight swelling left breast. The skin and nipples appear normal. There is no cervical, supraclavicular or axillary lymphadenopathy. Heart rate and rhythm normal, S1 and S2 appreciated. The lungs are clear to auscultation bilaterally. Abdomen is soft and non-tender.     Visit Diagnosis  1. Mastitis, left, acute  Clinic Appointment Request      2. Breast pain          Assessment/Plan  Stable clinical exam, left breast pain, left breast mastitis, no breast surgery or biopsy, no family history of breast cancer    Plan:  Clindamycin 3 times a day for 10 days. Complete all antibiotics. Warm compresses. Return after antibiotics are completed or sooner if symptoms worsen.     Patient Discussion/Summary  Clindamycin 3 times a day for 10 days. Complete all antibiotics. Warm compresses. Return after antibiotics are completed or sooner if symptoms worsen.     IMPORTANT INFORMATION REGARDING YOUR RESULTS    You can see your health information, review clinical summaries from office visits & test results online when you follow your health with MY  Chart, a personal health record. To sign up go to [email or web address]. If you need assistance with signing up or trouble getting into your account call [employer] Patient Line 24/7 at [phone].    My office phone number is [phone] if you need to get in touch with me or have additional questions or concerns. Thank you for choosing [facility] and trusting me as your healthcare provider. I look forward to seeing you again at your next office visit. I am honored to be  a provider on your health care team and I remain dedicated to helping you achieve your health goals.    Lluvia Barrow, APRN-CNP

## 2023-11-30 ENCOUNTER — OFFICE VISIT (OUTPATIENT)
Dept: SURGICAL ONCOLOGY | Facility: HOSPITAL | Age: 33
End: 2023-11-30
Payer: COMMERCIAL

## 2023-11-30 VITALS
WEIGHT: 206.7 LBS | TEMPERATURE: 97 F | HEART RATE: 110 BPM | BODY MASS INDEX: 34.4 KG/M2 | SYSTOLIC BLOOD PRESSURE: 142 MMHG | DIASTOLIC BLOOD PRESSURE: 94 MMHG

## 2023-11-30 DIAGNOSIS — N61.0 MASTITIS, LEFT, ACUTE: Primary | ICD-10-CM

## 2023-11-30 DIAGNOSIS — N64.4 BREAST PAIN: ICD-10-CM

## 2023-11-30 PROCEDURE — 3077F SYST BP >= 140 MM HG: CPT | Performed by: NURSE PRACTITIONER

## 2023-11-30 PROCEDURE — 1036F TOBACCO NON-USER: CPT | Performed by: NURSE PRACTITIONER

## 2023-11-30 PROCEDURE — 3080F DIAST BP >= 90 MM HG: CPT | Performed by: NURSE PRACTITIONER

## 2023-11-30 PROCEDURE — 99213 OFFICE O/P EST LOW 20 MIN: CPT | Performed by: NURSE PRACTITIONER

## 2023-11-30 PROCEDURE — 99203 OFFICE O/P NEW LOW 30 MIN: CPT | Performed by: NURSE PRACTITIONER

## 2023-11-30 PROCEDURE — 3051F HG A1C>EQUAL 7.0%<8.0%: CPT | Performed by: NURSE PRACTITIONER

## 2023-11-30 ASSESSMENT — PAIN SCALES - GENERAL: PAINLEVEL: 8

## 2023-12-01 PROBLEM — N61.0 MASTITIS, LEFT, ACUTE: Status: ACTIVE | Noted: 2023-12-01

## 2023-12-05 ENCOUNTER — TELEPHONE (OUTPATIENT)
Dept: SURGICAL ONCOLOGY | Facility: CLINIC | Age: 33
End: 2023-12-05
Payer: COMMERCIAL

## 2023-12-05 NOTE — TELEPHONE ENCOUNTER
Received a phone call from Nadeen- her breast swelling & pain have increased since her visit last week with Jimmy. Urgent note sent to Jimmy & admin support to schedule her with NP/GRAYSON as soon as able.

## 2023-12-05 NOTE — PROGRESS NOTES
Nadeen Dia female   1990 33 y.o.   [id number]    Chief Complaint  New patient, left breast pain.    History Of Present Illness  Nadeen Dia is a very pleasant 33 y.o. AA female presenting to the breast center with left breast pain and swelling. She denies nipple discharge, fever or chills. She was treated at [facility] for mastitis and put on Bactrim without improvement. She is no longer breast feeding and delivered 23. She returned to  ER on 23 and started on Clindamycin for mastitis. She feels the pain and swelling has lessoned since starting the antibiotics. She denies breast surgery or biopsy. She has no family history of breast cancer.     REPRODUCTIVE HISTORY: menarche age 12, , first birth age 24,  x 3 months, no OCP's, premenopausal, LMP                            FAMILY CANCER HISTORY:   none     Review of Systems  Constitutional:  Negative for appetite change, fatigue, fever and unexpected weight change.   HENT:  Negative for ear pain, hearing loss, nosebleeds, sore throat and trouble swallowing.    Eyes:  Negative for discharge, itching and visual disturbance.   Breast: As stated in HPI.  Respiratory:  Negative for cough, chest tightness and shortness of breath.    Cardiovascular:  Negative for chest pain, palpitations and leg swelling.   Gastrointestinal:  Negative for abdominal pain, constipation, diarrhea and nausea.   Endocrine: Negative for cold intolerance and heat intolerance.   Genitourinary:  Negative for dysuria, frequency, hematuria, pelvic pain and vaginal bleeding.   Musculoskeletal:  Negative for arthralgias, back pain, gait problem, joint swelling and myalgias.   Skin:  Negative for color change and rash.   Allergic/Immunologic: Negative for environmental allergies and food allergies.   Neurological:  Negative for dizziness, tremors, speech difficulty, weakness, numbness and headaches.   Hematological:  Does not bruise/bleed easily.   Psychiatric/Behavioral:   Negative for agitation, dysphoric mood and sleep disturbance. The patient is not nervous/anxious.      Past Medical History  She has a past medical history of 15 weeks gestation of pregnancy (01/22/2021), 19 weeks gestation of pregnancy (01/22/2021), 23 weeks gestation of pregnancy (01/22/2021), Cramp and spasm, Encounter for supervision of other normal pregnancy, unspecified trimester (01/22/2021), Encounter for surveillance of injectable contraceptive (04/08/2015), Exercise induced bronchospasm, Gestational (pregnancy-induced) hypertension without significant proteinuria, unspecified trimester (09/24/2014), High grade squamous intraepithelial lesion on cytologic smear of cervix (HGSIL) (12/03/2015), Maternal care for other (suspected) fetal abnormality and damage, fetal cardiac anomalies, not applicable or unspecified (01/22/2021), Other specified diseases and conditions complicating pregnancy (09/24/2014), Other specified pregnancy related conditions, unspecified trimester (01/22/2021), Personal history of gestational diabetes (01/22/2021), Personal history of other diseases of the musculoskeletal system and connective tissue, Personal history of other endocrine, nutritional and metabolic disease (10/16/2020), Pre-existing type 2 diabetes mellitus, in pregnancy, first trimester (01/22/2021), Pre-existing type 2 diabetes mellitus, in pregnancy, second trimester (01/22/2021), Pre-existing type 2 diabetes mellitus, in pregnancy, third trimester (08/09/2020), Pre-existing type 2 diabetes mellitus, in pregnancy, unspecified trimester (01/22/2021), Pregnancy with inconclusive fetal viability, not applicable or unspecified (01/22/2021), Type O blood, Rh negative, Unspecified maternal hypertension, third trimester (01/22/2021), Unspecified pre-existing hypertension complicating pregnancy, second trimester (01/22/2021), and Unspecified pre-existing hypertension complicating pregnancy, unspecified trimester  (01/22/2021).    Surgical History  She has no past surgical history on file.    Family History  Cancer-related family history includes Lung cancer in her maternal grandmother.     Social History  Social History     Tobacco Use    Smoking status: Never    Smokeless tobacco: Never   Substance Use Topics    Alcohol use: Not on file      Allergies  Bee venom protein (honey bee)    Medications  Current Outpatient Medications   Medication Instructions    acetaminophen (TYLENOL) 1,000 mg, oral, Every 6 hours PRN    aspirin 81 mg chewable tablet oral    benzalkonium chloride 0.13 % towelette Take B/P every day as instructed.    blood sugar diagnostic (Blood Glucose Test) strip CHECK BLOOD GLUCOSE DOROTEO DAILY, ONCE IN THE MORNING AFTER GETTING UP ( BEFORE EATING ANYTHING) AND AGAIN 2 HRS AFTER DINNER.    clindamycin (CLEOCIN) 450 mg, oral, 3 times daily    diphenhydrAMINE (BENADryl) 25 mg capsule oral    ferrous sulfate 325 (65 Fe) MG tablet 1 tablet, oral, Daily    ferrous sulfate 325 (65 Fe) MG tablet TAKE 1 TABLET DAILY AS DIRECTED.    FreeStyle Lancets 28 gauge     hydroCHLOROthiazide (HYDRODiuril) 25 mg tablet 1 tablet, oral, Daily    insulin lispro (HumaLOG) 100 unit/mL injection ADMINISTER 12 UNITS UNDER THE SKIN BEFORE MEALS    Lantus Solostar U-100 Insulin 100 unit/mL (3 mL) pen INJECT 35 UNITS UNDER THE SKIN TWICE DAILY    metFORMIN (Glucophage) 500 mg tablet     metFORMIN (Glucophage) 500 mg tablet TAKE 1 TABLET BY MOUTH TWO TIMES A DAY WITH MEALS    NIFEdipine CC 30 mg 24 hr tablet 1 tablet, oral, Daily    NIFEdipine ER (Adalat CC) 60 mg 24 hr tablet TAKE 1 TABLET BY MOUTH ONCE DAILY    NIFEdipine XL 60 mg 24 hr tablet     predniSONE (Deltasone) 50 mg tablet     Prenatabs Rx 29 mg iron- 1 mg tablet 1 tablet, oral, Daily    prenatal vitamin, iron-folic, (Prenatal Multivitamins) 27 mg iron-800 mcg folic acid tablet oral    pyridoxine (Vitamin B-6) 25 mg tablet oral, Every 8 hours       Last Recorded Vitals  There  were no vitals taken for this visit.    Physical Exam  Patient is alert and oriented x3 and in a relaxed and appropriate mood. Her gait is steady and hand grasps are equal. Sclera is clear. The breasts are nearly symmetrical. The tissue is soft without palpable abnormalities, discrete nodules or masses.   Slight swelling left breast. The skin and nipples appear normal. There is no cervical, supraclavicular or axillary lymphadenopathy. Heart rate and rhythm normal, S1 and S2 appreciated. The lungs are clear to auscultation bilaterally. Abdomen is soft and non-tender.     Visit Diagnosis  No diagnosis found.    Assessment/Plan  Stable clinical exam, left breast pain, left breast mastitis, no breast surgery or biopsy, no family history of breast cancer    Plan:  Clindamycin 3 times a day for 10 days. Complete all antibiotics. Warm compresses. Return after antibiotics are completed or sooner if symptoms worsen.     Patient Discussion/Summary  Clindamycin 3 times a day for 10 days. Complete all antibiotics. Warm compresses. Return after antibiotics are completed or sooner if symptoms worsen.     IMPORTANT INFORMATION REGARDING YOUR RESULTS    You can see your health information, review clinical summaries from office visits & test results online when you follow your health with MY  Chart, a personal health record. To sign up go to [email or web address]. If you need assistance with signing up or trouble getting into your account call [employer] Patient Line 24/7 at [phone].    My office phone number is [phone] if you need to get in touch with me or have additional questions or concerns. Thank you for choosing [facility] and trusting me as your healthcare provider. I look forward to seeing you again at your next office visit. I am honored to be a provider on your health care team and I remain dedicated to helping you achieve your health goals.    Lluvia Barrow, MARYAM-CNP

## 2023-12-07 ENCOUNTER — APPOINTMENT (OUTPATIENT)
Dept: RADIOLOGY | Facility: HOSPITAL | Age: 33
End: 2023-12-07
Payer: COMMERCIAL

## 2023-12-07 ENCOUNTER — APPOINTMENT (OUTPATIENT)
Dept: SURGICAL ONCOLOGY | Facility: HOSPITAL | Age: 33
End: 2023-12-07
Payer: COMMERCIAL

## 2023-12-08 NOTE — PROGRESS NOTES
Nadeen Dia female   1990 33 y.o.   [id number]    Chief Complaint  Follow up left breast pain.    History Of Present Illness  Nadeen Dia is a very pleasant 33 y.o. AA female her for follow up of left breast pain and swelling. She denies nipple discharge, fever or chills. She was treated at [facility] for mastitis and put on Bactrim without improvement. She is no longer breast feeding and delivered 23. She returned to  ER on 23 and started on Clindamycin for mastitis and felt the pain and swelling had lessoned since starting the antibiotics. She denies breast surgery or biopsy. She has no family history of breast cancer. Today she feels her left breast is still swollen and tender around the nipple although it has lessoned. She no longer feels her lymph nodes are tender or enlarged. No fever or chills.     BREAST IMAGIN2023 Bilateral diagnostic mammogram with left breast ultrasound, indicates BI-RADS Category 1.    REPRODUCTIVE HISTORY: menarche age 12, , first birth age 24,  x 3 months, no OCP's, premenopausal, LMP                            FAMILY CANCER HISTORY:   none     Review of Systems  Constitutional:  Negative for appetite change, fatigue, fever and unexpected weight change.   HENT:  Negative for ear pain, hearing loss, nosebleeds, sore throat and trouble swallowing.    Eyes:  Negative for discharge, itching and visual disturbance.   Breast: As stated in HPI.  Respiratory:  Negative for cough, chest tightness and shortness of breath.    Cardiovascular:  Negative for chest pain, palpitations and leg swelling.   Gastrointestinal:  Negative for abdominal pain, constipation, diarrhea and nausea.   Endocrine: Negative for cold intolerance and heat intolerance.   Genitourinary:  Negative for dysuria, frequency, hematuria, pelvic pain and vaginal bleeding.   Musculoskeletal:  Negative for arthralgias, back pain, gait problem, joint swelling and myalgias.   Skin:  Negative for  color change and rash.   Allergic/Immunologic: Negative for environmental allergies and food allergies.   Neurological:  Negative for dizziness, tremors, speech difficulty, weakness, numbness and headaches.   Hematological:  Does not bruise/bleed easily.   Psychiatric/Behavioral:  Negative for agitation, dysphoric mood and sleep disturbance. The patient is not nervous/anxious.      Past Medical History  She has a past medical history of 15 weeks gestation of pregnancy (01/22/2021), 19 weeks gestation of pregnancy (01/22/2021), 23 weeks gestation of pregnancy (01/22/2021), Cramp and spasm, Encounter for supervision of other normal pregnancy, unspecified trimester (01/22/2021), Encounter for surveillance of injectable contraceptive (04/08/2015), Exercise induced bronchospasm, Gestational (pregnancy-induced) hypertension without significant proteinuria, unspecified trimester (09/24/2014), High grade squamous intraepithelial lesion on cytologic smear of cervix (HGSIL) (12/03/2015), Maternal care for other (suspected) fetal abnormality and damage, fetal cardiac anomalies, not applicable or unspecified (01/22/2021), Other specified diseases and conditions complicating pregnancy (09/24/2014), Other specified pregnancy related conditions, unspecified trimester (01/22/2021), Personal history of gestational diabetes (01/22/2021), Personal history of other diseases of the musculoskeletal system and connective tissue, Personal history of other endocrine, nutritional and metabolic disease (10/16/2020), Pre-existing type 2 diabetes mellitus, in pregnancy, first trimester (01/22/2021), Pre-existing type 2 diabetes mellitus, in pregnancy, second trimester (01/22/2021), Pre-existing type 2 diabetes mellitus, in pregnancy, third trimester (08/09/2020), Pre-existing type 2 diabetes mellitus, in pregnancy, unspecified trimester (01/22/2021), Pregnancy with inconclusive fetal viability, not applicable or unspecified (01/22/2021), Type O  blood, Rh negative, Unspecified maternal hypertension, third trimester (01/22/2021), Unspecified pre-existing hypertension complicating pregnancy, second trimester (01/22/2021), and Unspecified pre-existing hypertension complicating pregnancy, unspecified trimester (01/22/2021).    Surgical History  She has no past surgical history on file.    Family History  Cancer-related family history includes Lung cancer in her maternal grandmother.     Social History  Social History     Tobacco Use    Smoking status: Never    Smokeless tobacco: Never   Substance Use Topics    Alcohol use: Not on file      Allergies  Bee venom protein (honey bee)    Medications  Current Outpatient Medications   Medication Instructions    aspirin 81 mg chewable tablet oral    benzalkonium chloride 0.13 % towelette Take B/P every day as instructed.    blood sugar diagnostic (Blood Glucose Test) strip CHECK BLOOD GLUCOSE DOROTEO DAILY, ONCE IN THE MORNING AFTER GETTING UP ( BEFORE EATING ANYTHING) AND AGAIN 2 HRS AFTER DINNER.    diphenhydrAMINE (BENADryl) 25 mg capsule oral    ferrous sulfate 325 (65 Fe) MG tablet 1 tablet, oral, Daily    ferrous sulfate 325 (65 Fe) MG tablet TAKE 1 TABLET DAILY AS DIRECTED.    FreeStyle Lancets 28 gauge     hydroCHLOROthiazide (HYDRODiuril) 25 mg tablet 1 tablet, oral, Daily    insulin lispro (HumaLOG) 100 unit/mL injection ADMINISTER 12 UNITS UNDER THE SKIN BEFORE MEALS    Lantus Solostar U-100 Insulin 100 unit/mL (3 mL) pen INJECT 35 UNITS UNDER THE SKIN TWICE DAILY    metFORMIN (Glucophage) 500 mg tablet     metFORMIN (Glucophage) 500 mg tablet TAKE 1 TABLET BY MOUTH TWO TIMES A DAY WITH MEALS    NIFEdipine CC 30 mg 24 hr tablet 1 tablet, oral, Daily    NIFEdipine ER (Adalat CC) 60 mg 24 hr tablet TAKE 1 TABLET BY MOUTH ONCE DAILY    NIFEdipine XL 60 mg 24 hr tablet     predniSONE (Deltasone) 50 mg tablet     Prenatabs Rx 29 mg iron- 1 mg tablet 1 tablet, oral, Daily    prenatal vitamin, iron-folic, (Prenatal  Multivitamins) 27 mg iron-800 mcg folic acid tablet oral    pyridoxine (Vitamin B-6) 25 mg tablet oral, Every 8 hours       Last Recorded Vitals  Blood pressure (!) 167/99, pulse 100, temperature 37.1 °C (98.8 °F), weight 93.9 kg (207 lb).    Physical Exam  Patient is alert and oriented x3 and in a relaxed and appropriate mood. Her gait is steady and hand grasps are equal. Sclera is clear. The breasts are nearly symmetrical left slightly larger than the right. The tissue is soft without palpable abnormalities, discrete nodules or masses. The skin and nipples appear normal. There is no cervical, supraclavicular or axillary lymphadenopathy. Heart rate and rhythm normal, S1 and S2 appreciated. The lungs are clear to auscultation bilaterally. Abdomen is soft and non-tender.     Visit Diagnosis  1. Breast pain, left        2. Mastitis, left, acute  Clinic Appointment Request    CANCELED: BI mammo bilateral diagnostic      Orders Placed This Encounter      amoxicillin-pot clavulanate (Augmentin) 875-125 mg tablet      fluconazole (Diflucan) 150 mg tablet     Recent Imaging  Study Result    Narrative & Impression   Interpreted By:  Karla Morales and Kelly Rory   STUDY:  BI MAMMO BILATERAL DIAGNOSTIC TOMOSYNTHESIS; BI US BREAST LIMITED  LEFT;  12/11/2023 11:20 am; 12/11/2023 11:32 am      ACCESSION NUMBER(S):  [id number]; [id number]      ORDERING CLINICIAN:  RADHA WARNER      INDICATION:  Signs/Symptoms:worsening mastitis breast pain; Signs/Symptoms:left  breast pain and swelling, recent diagnosis of mastitis. History of  left subareolar breast pain not improving on antibiotics. Recent  pregnancy with delivery on 08/16/2023 and cessation of breastfeeding  approximately 1 month ago.      COMPARISON:  None.      FINDINGS:  MAMMOGRAPHY: 2D and tomosynthesis images were reviewed at 1 mm slice  thickness.      Density:  The breast tissue is heterogeneously dense, which may  obscure small masses.      No suspicious masses  or calcifications are identified. With specific  attention to the subareolar left breast and left axilla, there is no  mammographic abnormality. There is no evidence of mass, trabecular  thickening or skin thickening.      ULTRASOUND:  Ultrasound of the subareolar left breast demonstrates  normal fibroglandular tissue and minimally ectatic anechoic ducts  which taper distally; there are no intraductal lesions or intraductal  vascularity. The subareolar tissue is soft on elastography.  Sonographic evaluation of the left axilla demonstrates nonenlarged  benign appearing lymph nodes.      IMPRESSION:  No mammographic evidence of malignancy. No mammographic or  sonographic abnormalities within the left breast. Independent  clinical management of the patient's mastitis recommended.      BI-RADS CATEGORY:      BI-RADS Category:  1 Negative.  Recommendation:  Clinical follow-up.  Recommended Date:  N/A.  Laterality:  Bilateral.      For any future breast imaging appointments, please call [phone]).      I personally reviewed the images/study with Deondre Ureña MD (Radiology  Resident) and I agree with the findings as stated. This study was  interpreted at [place], [place].      MACRO:  None      Signed by: Karla Mroales 12/11/2023 11:51 AM     Assessment/Plan  Stable clinical exam, left breast pain, left breast mastitis, improving, no breast surgery or biopsy, no family history of breast cancer    Plan:  Breast pain education given and encouraged to try home remedies. Augmentin 1 tab twice a day for 7 days. Return if symptoms worsen or persist, otherwise can follow up with PCP.    Patient Discussion/Summary  Breast pain education given and encouraged to try home remedies. Augmentin 1 tab twice a day for 7 days. Return if symptoms worsen or persist, otherwise can follow up with PCP.    IMPORTANT INFORMATION REGARDING YOUR RESULTS    You can see your health information,  review clinical summaries from office visits & test results online when you follow your health with MY  Chart, a personal health record. To sign up go to [email or web address]. If you need assistance with signing up or trouble getting into your account call [employer] Patient Line 24/7 at [phone].    My office phone number is [phone] if you need to get in touch with me or have additional questions or concerns. Thank you for choosing [facility] and trusting me as your healthcare provider. I look forward to seeing you again at your next office visit. I am honored to be a provider on your health care team and I remain dedicated to helping you achieve your health goals.    Lluvia Barrow, MARYAM-CNP

## 2023-12-11 ENCOUNTER — APPOINTMENT (OUTPATIENT)
Dept: SURGICAL ONCOLOGY | Facility: CLINIC | Age: 33
End: 2023-12-11
Payer: COMMERCIAL

## 2023-12-11 ENCOUNTER — HOSPITAL ENCOUNTER (OUTPATIENT)
Dept: RADIOLOGY | Facility: HOSPITAL | Age: 33
Discharge: HOME | End: 2023-12-11
Payer: COMMERCIAL

## 2023-12-11 ENCOUNTER — OFFICE VISIT (OUTPATIENT)
Dept: SURGICAL ONCOLOGY | Facility: HOSPITAL | Age: 33
End: 2023-12-11
Payer: COMMERCIAL

## 2023-12-11 VITALS
TEMPERATURE: 98.8 F | HEART RATE: 100 BPM | WEIGHT: 207 LBS | BODY MASS INDEX: 34.45 KG/M2 | SYSTOLIC BLOOD PRESSURE: 167 MMHG | DIASTOLIC BLOOD PRESSURE: 99 MMHG

## 2023-12-11 DIAGNOSIS — N61.0 MASTITIS, LEFT, ACUTE: ICD-10-CM

## 2023-12-11 DIAGNOSIS — N64.4 BREAST PAIN, LEFT: Primary | ICD-10-CM

## 2023-12-11 DIAGNOSIS — N64.4 MASTODYNIA: ICD-10-CM

## 2023-12-11 DIAGNOSIS — N63.0 BREAST SWELLING: ICD-10-CM

## 2023-12-11 DIAGNOSIS — N63.0 UNSPECIFIED LUMP IN UNSPECIFIED BREAST: ICD-10-CM

## 2023-12-11 DIAGNOSIS — N64.4 BREAST PAIN: ICD-10-CM

## 2023-12-11 PROCEDURE — 99213 OFFICE O/P EST LOW 20 MIN: CPT | Performed by: NURSE PRACTITIONER

## 2023-12-11 PROCEDURE — 3077F SYST BP >= 140 MM HG: CPT | Performed by: NURSE PRACTITIONER

## 2023-12-11 PROCEDURE — 3051F HG A1C>EQUAL 7.0%<8.0%: CPT | Performed by: NURSE PRACTITIONER

## 2023-12-11 PROCEDURE — 77066 DX MAMMO INCL CAD BI: CPT

## 2023-12-11 PROCEDURE — 77066 DX MAMMO INCL CAD BI: CPT | Performed by: RADIOLOGY

## 2023-12-11 PROCEDURE — 77062 BREAST TOMOSYNTHESIS BI: CPT | Performed by: RADIOLOGY

## 2023-12-11 PROCEDURE — 99213 OFFICE O/P EST LOW 20 MIN: CPT | Mod: 25 | Performed by: NURSE PRACTITIONER

## 2023-12-11 PROCEDURE — 76642 ULTRASOUND BREAST LIMITED: CPT | Performed by: RADIOLOGY

## 2023-12-11 PROCEDURE — 76982 USE 1ST TARGET LESION: CPT | Mod: LT

## 2023-12-11 PROCEDURE — 3080F DIAST BP >= 90 MM HG: CPT | Performed by: NURSE PRACTITIONER

## 2023-12-11 PROCEDURE — 76642 ULTRASOUND BREAST LIMITED: CPT | Mod: LT

## 2023-12-11 PROCEDURE — 1036F TOBACCO NON-USER: CPT | Performed by: NURSE PRACTITIONER

## 2023-12-11 RX ORDER — FLUCONAZOLE 150 MG/1
TABLET ORAL
Qty: 2 TABLET | Refills: 0 | Status: SHIPPED | OUTPATIENT
Start: 2023-12-11 | End: 2023-12-20 | Stop reason: ALTCHOICE

## 2023-12-11 RX ORDER — AMOXICILLIN AND CLAVULANATE POTASSIUM 875; 125 MG/1; MG/1
875 TABLET, FILM COATED ORAL 2 TIMES DAILY
Qty: 14 TABLET | Refills: 0 | Status: SHIPPED | OUTPATIENT
Start: 2023-12-11 | End: 2023-12-18

## 2023-12-11 ASSESSMENT — PAIN SCALES - GENERAL: PAINLEVEL: 2

## 2023-12-20 ENCOUNTER — OFFICE VISIT (OUTPATIENT)
Dept: PRIMARY CARE | Facility: CLINIC | Age: 33
End: 2023-12-20
Payer: COMMERCIAL

## 2023-12-20 VITALS
DIASTOLIC BLOOD PRESSURE: 82 MMHG | BODY MASS INDEX: 35.02 KG/M2 | HEIGHT: 65 IN | SYSTOLIC BLOOD PRESSURE: 122 MMHG | HEART RATE: 112 BPM | OXYGEN SATURATION: 98 % | WEIGHT: 210.2 LBS

## 2023-12-20 DIAGNOSIS — E11.9 TYPE 2 DIABETES MELLITUS WITHOUT COMPLICATION, WITHOUT LONG-TERM CURRENT USE OF INSULIN (MULTI): ICD-10-CM

## 2023-12-20 DIAGNOSIS — Z00.00 ROUTINE GENERAL MEDICAL EXAMINATION AT A HEALTH CARE FACILITY: Primary | ICD-10-CM

## 2023-12-20 DIAGNOSIS — I10 BENIGN ESSENTIAL HYPERTENSION: ICD-10-CM

## 2023-12-20 PROBLEM — N64.4 BREAST PAIN: Status: RESOLVED | Noted: 2023-11-29 | Resolved: 2023-12-20

## 2023-12-20 PROBLEM — N64.4 BREAST PAIN, LEFT: Status: RESOLVED | Noted: 2023-12-11 | Resolved: 2023-12-20

## 2023-12-20 PROBLEM — O35.BXX0 ABNORMAL FETAL ECHOCARDIOGRAM AFFECTING ANTEPARTUM CARE OF MOTHER (HHS-HCC): Status: RESOLVED | Noted: 2023-09-28 | Resolved: 2023-12-20

## 2023-12-20 PROBLEM — O24.119 PRE-EXISTING TYPE 2 DIABETES MELLITUS DURING PREGNANCY, ANTEPARTUM (HHS-HCC): Status: RESOLVED | Noted: 2023-09-28 | Resolved: 2023-12-20

## 2023-12-20 PROBLEM — O09.90 HIGH RISK PREGNANCY, ANTEPARTUM (HHS-HCC): Status: RESOLVED | Noted: 2023-09-28 | Resolved: 2023-12-20

## 2023-12-20 PROBLEM — N89.8 VAGINAL DISCHARGE: Status: RESOLVED | Noted: 2023-09-28 | Resolved: 2023-12-20

## 2023-12-20 PROBLEM — O10.919 CHRONIC HYPERTENSION IN PREGNANCY (HHS-HCC): Status: RESOLVED | Noted: 2023-09-28 | Resolved: 2023-12-20

## 2023-12-20 PROBLEM — O24.912: Status: RESOLVED | Noted: 2023-09-28 | Resolved: 2023-12-20

## 2023-12-20 PROBLEM — N61.0 MASTITIS, LEFT, ACUTE: Status: RESOLVED | Noted: 2023-12-01 | Resolved: 2023-12-20

## 2023-12-20 PROBLEM — O10.919 MATERNAL CHRONIC HYPERTENSION (HHS-HCC): Status: RESOLVED | Noted: 2023-09-28 | Resolved: 2023-12-20

## 2023-12-20 PROBLEM — D50.9 IRON DEFICIENCY ANEMIA: Status: RESOLVED | Noted: 2023-09-28 | Resolved: 2023-12-20

## 2023-12-20 PROBLEM — O35.9XX0 SUSPECTED FETAL ANOMALY, ANTEPARTUM (HHS-HCC): Status: RESOLVED | Noted: 2023-09-28 | Resolved: 2023-12-20

## 2023-12-20 PROBLEM — B37.31 VAGINAL YEAST INFECTION: Status: RESOLVED | Noted: 2023-09-28 | Resolved: 2023-12-20

## 2023-12-20 PROBLEM — N92.6 MISSED PERIOD: Status: RESOLVED | Noted: 2023-09-28 | Resolved: 2023-12-20

## 2023-12-20 LAB
CREAT UR-MCNC: 329.1 MG/DL (ref 20–320)
EST. AVERAGE GLUCOSE BLD GHB EST-MCNC: 260 MG/DL
HBA1C MFR BLD: 10.7 %
MICROALBUMIN UR-MCNC: 49.9 MG/L
MICROALBUMIN/CREAT UR: 15.2 UG/MG CREAT
TSH SERPL-ACNC: 1.15 MIU/L (ref 0.44–3.98)

## 2023-12-20 PROCEDURE — 36415 COLL VENOUS BLD VENIPUNCTURE: CPT

## 2023-12-20 PROCEDURE — 3079F DIAST BP 80-89 MM HG: CPT | Performed by: STUDENT IN AN ORGANIZED HEALTH CARE EDUCATION/TRAINING PROGRAM

## 2023-12-20 PROCEDURE — 3074F SYST BP LT 130 MM HG: CPT | Performed by: STUDENT IN AN ORGANIZED HEALTH CARE EDUCATION/TRAINING PROGRAM

## 2023-12-20 PROCEDURE — 1036F TOBACCO NON-USER: CPT | Performed by: STUDENT IN AN ORGANIZED HEALTH CARE EDUCATION/TRAINING PROGRAM

## 2023-12-20 PROCEDURE — 99395 PREV VISIT EST AGE 18-39: CPT | Performed by: STUDENT IN AN ORGANIZED HEALTH CARE EDUCATION/TRAINING PROGRAM

## 2023-12-20 PROCEDURE — 3051F HG A1C>EQUAL 7.0%<8.0%: CPT | Performed by: STUDENT IN AN ORGANIZED HEALTH CARE EDUCATION/TRAINING PROGRAM

## 2023-12-20 PROCEDURE — 83036 HEMOGLOBIN GLYCOSYLATED A1C: CPT

## 2023-12-20 PROCEDURE — 82570 ASSAY OF URINE CREATININE: CPT

## 2023-12-20 PROCEDURE — 84443 ASSAY THYROID STIM HORMONE: CPT

## 2023-12-20 PROCEDURE — 82043 UR ALBUMIN QUANTITATIVE: CPT

## 2023-12-20 PROCEDURE — 99214 OFFICE O/P EST MOD 30 MIN: CPT | Performed by: STUDENT IN AN ORGANIZED HEALTH CARE EDUCATION/TRAINING PROGRAM

## 2023-12-20 RX ORDER — NIFEDIPINE 60 MG/1
60 TABLET, FILM COATED, EXTENDED RELEASE ORAL DAILY
Qty: 90 TABLET | Refills: 1 | Status: SHIPPED | OUTPATIENT
Start: 2023-12-20 | End: 2024-05-21 | Stop reason: SDUPTHER

## 2023-12-20 NOTE — PROGRESS NOTES
"  Subjective     Patient ID: Nadeen Dia is a 33 y.o. female who presents for Annual Exam (CPE. Pt declined flu shot. ).      Last Physical : ___Years ago     Pt's PMH, PSH, SH, FH , meds and allergies was obtained / reviewed and updated .     Dental visits : Y  Vision issues : N  Hearing issues : N    Immunizations : Y    Diet :  could be better  Exercise:  Weight concerns :     Alcohol: as noted in   Tobacco: as noted in   Recreational drug use : None/ as noted in       ======================================================    Visit Vitals  /82   Pulse (!) 112   Ht 1.651 m (5' 5\")   Wt 95.3 kg (210 lb 3.2 oz)   LMP 12/04/2023   SpO2 98%   BMI 34.98 kg/m²   OB Status Having periods   Smoking Status Never   BSA 2.09 m²      Patient's last menstrual period was 12/04/2023.     =====================================    Review of systems:  Constitutional: no chills, no fever and no night sweats.     Eyes: no blurred vision and no eyesight problems.     ENT: no hearing loss, no nasal congestion, no nasal discharge, no hoarseness and no sore throat.     Cardiovascular: no chest pain, no intermittent leg claudication, no lower extremity edema, no palpitations and no syncope.     Respiratory: no cough, no shortness of breath during exertion, no shortness of breath at rest and no wheezing.     Gastrointestinal: no abdominal pain, no blood in stools, no constipation, no diarrhea, no melena, no nausea, no rectal pain and no vomiting.     Genitourinary: no dysuria, no change in urinary frequency, no urinary hesitancy, no feelings of urinary urgency and no vaginal discharge.     Musculoskeletal: no arthralgias, no back pain and no myalgias.     Integumentary: no new skin lesions and no rashes.     Neurological: no difficulty walking, no headache, no limb weakness, no numbness and no tingling.     Psychiatric: no anxiety, no depression, no anhedonia and no substance use disorders. "   ============================================================    Physical exam :    Constitutional: Alert and in no acute distress. Well developed, well nourished.     Eyes: Normal external exam. Pupils were equal in size, round, reactive to light (PERRL) with normal accommodation and extraocular movements intact (EOMI).     Ears, Nose, Mouth, and Throat: External inspection of ears and nose: Normal.  Otoscopic examination: Normal.      Neck: No neck mass was observed. Supple.     Cardiovascular: Heart rate and rhythm were normal, normal S1 and S2, no gallops, no murmurs and no pericardial rub    Pulmonary: No respiratory distress. Clear bilateral breath sounds.     Abdomen: Soft nontender; no abdominal mass palpated. No organomegaly.     Musculoskeletal: No joint swelling seen, normal movements of all extremities. Range of motion: Normal.  Muscle strength/tone: Normal.      Neurologic: Deep tendon reflexes were 2+ and symmetric. Sensation: Normal.     Psychiatric: Judgment and insight: Intact. Mood and affect: Normal.        Assessment/Plan    Diagnoses and all orders for this visit:  Routine general medical examination at a health care facility  Benign essential hypertension  -     NIFEdipine ER (Adalat CC) 60 mg 24 hr tablet; TAKE 1 TABLET BY MOUTH ONCE DAILY  Type 2 diabetes mellitus without complication, without long-term current use of insulin (CMS/[facility])  -     Hemoglobin A1C; Future  -     TSH with reflex to Free T4 if abnormal; Future  -     Albumin , Urine Random; Future          34 y/o female with HTN, DM2 , now 4m PP ( DOD August 2023 )     HTN : at goal   Nifedipine 60mg ER once day   ( Pt was not sure of the name of med she was taking .  (Hydrochlorothiazide and Nifedipine were noted in the med list . She was upset when I kept asking .unfortunately med rec is not done at every visit else where . Lantus dose was not correct either .   Pt kept saying to refer to what's on the computer , which clearly did  not reflect her current meds )    DM2 : due for A1c   She is currently taking 12 units of lantus in the morning and 12 units in the pm       HM :   Vaccines: UTD  -Tdap :  -Flu :       Cancer screenings:  -PAP : 2022. Est with gyn     General preventive care discussed which includes regular exercise, healthy eating habits, to include more of plant based diet, to include foods of all colors  , limiting excessive red meat intake , staying active to maintain a weight that is appropriate for his/ her height / making efforts to reach an ideal weight for height,  avoidance of smoking, excess alcohol consumption, avoidance of recreational drugs, safe and responsible sexual relationships and practices.     Calcium + Vit D supplements recommended   Regular exercise recommended   Healthy eating choices discussed and recommended   BMI ( Body mass index ) discussed and encouraged weight loss through the above discussed lifestyle modifications .     Conditions addressed and mgmt as noted above.  Pertinent labs, images/ imaging reports , chart review was done .   Age appropriate labs / labs for mgmt of chronic medical conditions ordered, further mgmt pending the results.       RT0 in 3m or sooner if needed

## 2024-01-01 DIAGNOSIS — E11.9 TYPE 2 DIABETES MELLITUS WITHOUT COMPLICATION, WITH LONG-TERM CURRENT USE OF INSULIN (MULTI): Primary | ICD-10-CM

## 2024-01-01 DIAGNOSIS — Z79.4 TYPE 2 DIABETES MELLITUS WITHOUT COMPLICATION, WITH LONG-TERM CURRENT USE OF INSULIN (MULTI): Primary | ICD-10-CM

## 2024-03-06 NOTE — CONSULTS
Service:   Service:  Service: General     History of Present Illness:   History Present Illness:  HPI:    Ms. Nadeen Dia is a 32-year-old -American female who is postpartum day 7 with history of hypertension and diabetes presenting for further evaluation of left facial droop  for which BAT was called this a.m.  LKW 4 AM patient awoke at 8 AM noting left lower facial droop subsequently reported to ED.  Denied recent sick contacts illnesses and known rashes.  Denied associated neurologic symptoms such as vision changes, weakness  and sensory loss and gait issues.  Neurologic exam notable for peripheral left cranial nerve VII palsy with a House-Brackman score of 3.     BAT was canceled, neurology consulted for further evaluation and recommendations of peripheral 7 palsy.    On arrival calm and comfortable resting in bed  VS - AF, /77, ,    NIH 3 (complete LMN facial paresis)  LKW 4AM  No imaging   No TNK/MT given low suspicion for acute stroke, minor deficits and no evidence of LVO    PMH: HTN, DM2  PSH: N/A  MEDICATIONS:  - Nifedipine  - Metformin  ALLERGIES: NKDA  SOCIAL:  - EOTH denied  - TOBACCO denied  - ILLICITS denied                         Allergies:  ·  NKDA :   ·  Bee Stings : Rash    Objective:     Objective Information:        T   P  R  BP   MAP  SpO2   Value  36.7  97  17  133/67   94  98%  Date/Time 8/23 10:05 8/23 11:16 8/23 10:05 8/23 11:16  8/23 11:16 8/23 11:13  Range  (36.7C - 36.7C )  (93 - 115 )  (17 - 17 )  (130 - 147 )/ (67 - 81 )  (91 - 108 )  (97% - 100% )    Physical Exam Narrative:  ·  Physical Exam:    GENERAL APPEARANCE:  No distress, alert, interactive and cooperative.     CARDIOVASCULAR: Regular, rate and rhythm, no murmurs, normal S1 and S2. Carotid pulses intact without any bruits. Pulses +2 and equal in all extremities. No swelling, varicosities, edema, or tenderness to palpation.      MENTAL STATE:   Orientation was normal to time, place and person.  Recent and remote memory was intact.  Attention span and concentration were normal. Language testing was normal for comprehension, repetition, expression, and naming. General fund of knowledge was intact.     OPHTHALMOSCOPIC:   Deferred     CRANIAL NERVES:   CN 2   Visual fields full to confrontation.   CN 3, 4, 6   Pupils round, 4 mm in diameter, equally reactive to light. Lids symmetric; no ptosis. EOMs normal alignment, full range with normal saccades, pursuit and convergence.   No nystagmus.   CN 5   Facial sensation intact bilaterally.   CN 7   Peripheral 7. Able to close eyes fully unable to maintain closure against resistance. Asymmetric brow. L nasolabial fold flattening.   CN 8   Hearing intact to finger rub.   CN 9   Palate elevates symmetrically.   CN 11   Normal strength of shoulder shrug and neck turning.   CN 12   Tongue midline, with normal bulk and strength; no fasciculations.     MOTOR:   Muscle bulk and tone were normal in both upper and lower extremities.   No fasciculations, tremor or other abnormal movements were present.                         R          L  Deltoids        5          5  Biceps          5          5  Triceps          5          5  Wrist Flex      5          5  Wrist Ext       5          5    Hip Flex         5          5  Knee Flex      5          5  Knee Ext        5          5  Dorsiflex         5          5  Plantarflex      5          5    REFLEXES:                       R          L  BR:               2         2  Biceps:         2          2  Triceps:        2          2  Knee:            2          2  Ankle:          2          2    Babinski: toes downgoing to plantar stimulation. No clonus or other pathologic reflexes present.     SENSORY:   In both upper and lower extremities, sensation was intact to light touch    COORDINATION:    In both upper extremities, finger-nose-finger was intact without dysmetria or overshoot.   In both lower extremities, heel-to-shin was  intact. TIN were intact in both upper and lower extremities.      GAIT:   Station was stable with a normal base. Gait was stable with a normal arm swing and speed. No ataxia, shuffling, steppage or waddling was present. No circumduction was present. Tandem gait was intact. No Romberg sign was present.     Medications:    Medications:          Continuous Medications       --------------------------------  No continuous medications are active       Scheduled Medications       --------------------------------    1. Iohexol (Omnipaque 350-Radiology Contrast):  147.15  mL  IntraVenous Push  Once    2. metFORMIN (GLUCOPHAGE):  1000  mg  Oral  Once         PRN Medications       --------------------------------    1. hydrALAZINE (APRESOLINE) Injectable:  5  mg  IntraVenous Push  Once    2. Labetalol Injectable:  20  mg  IntraVenous Push  Once    3. NIFEdipine (PROCARDIA) Immediate Release:  10  mg  Oral  Once        NIH Stroke Scale:     Facial Paresis: 3 = complete LMN       Assessment/Recommendations:  Assessment:    Ms. Nadeen Dia is a 32-year-old -American female who is postpartum day 7 with history of hypertension and diabetes presenting for further evaluation of left facial droop  for which BAT was called this a.m.  LKW 4 AM patient awoke at 8 AM noting left lower facial droop subsequently reported to ED.  Denied recent sick contacts illnesses and known rashes.  Denied associated neurologic symptoms such as vision changes, weakness  and sensory loss and gait issues.  Neurologic exam notable for peripheral left cranial nerve VII palsy with a House-Brackman score of 3.     BAT was canceled, neurology consulted for further evaluation and recommendations of peripheral 7 palsy.    Impression: Idiopathic Bell's palsy in the setting of first week postpartum and as well as uncontrolled hypertension     Recommendations:   - Recommend oral glucocorticoids -prednisone 60 mg for 7 days  - Should patient feel left eye  discomfort artificial tears may be warranted in addition to potential eye patch to prevent corneal abrasions  - Please obtain serologic testing for Lyme disease  - No imaging needed at this time  - Outpatient neurology resident clinic if patient follow-up.    Recommendations not final to be staffed this pratibha Buckley DO   PGY 3 Neurology  Doc Halo preferred (8am - 4pm)     Attestation:   Note Completion:  I am a:  Resident/Fellow   Attending Attestation I reviewed the resident/fellow?s documentation and discussed the patient with the resident/fellow.  I agree with the resident/fellow?s medical  decision making as documented in the note.          Electronic Signatures:  Anibal Buckley (DO (Resident))  (Signed 23-Aug-2023 11:39)   Authored: Service, History of Present Illness, Allergies,  Objective, NIH Stroke Scale, Assessment/Recommendations, Note Completion  Ike Ivory)  (Signed 08-Sep-2023 16:40)   Authored: Note Completion   Co-Signer: Service, History of Present Illness, Allergies, Objective, NIH Stroke Scale, Assessment/Recommendations, Note Completion      Last Updated: 08-Sep-2023 16:40 by Ike Ivory)

## 2024-03-14 ENCOUNTER — LAB (OUTPATIENT)
Dept: LAB | Facility: LAB | Age: 34
End: 2024-03-14
Payer: COMMERCIAL

## 2024-03-14 ENCOUNTER — OFFICE VISIT (OUTPATIENT)
Dept: OBSTETRICS AND GYNECOLOGY | Facility: CLINIC | Age: 34
End: 2024-03-14
Payer: COMMERCIAL

## 2024-03-14 VITALS
HEIGHT: 65 IN | SYSTOLIC BLOOD PRESSURE: 126 MMHG | DIASTOLIC BLOOD PRESSURE: 70 MMHG | WEIGHT: 207 LBS | BODY MASS INDEX: 34.49 KG/M2

## 2024-03-14 DIAGNOSIS — R87.612 LGSIL ON PAP SMEAR OF CERVIX: ICD-10-CM

## 2024-03-14 DIAGNOSIS — Z01.419 WELL WOMAN EXAM: ICD-10-CM

## 2024-03-14 DIAGNOSIS — N94.6 DYSMENORRHEA: ICD-10-CM

## 2024-03-14 DIAGNOSIS — N94.6 DYSMENORRHEA: Primary | ICD-10-CM

## 2024-03-14 DIAGNOSIS — Z30.011 ENCOUNTER FOR INITIAL PRESCRIPTION OF CONTRACEPTIVE PILLS: ICD-10-CM

## 2024-03-14 LAB
ERYTHROCYTE [DISTWIDTH] IN BLOOD BY AUTOMATED COUNT: 16.8 % (ref 11.5–14.5)
HCT VFR BLD AUTO: 41.1 % (ref 36–46)
HGB BLD-MCNC: 12.2 G/DL (ref 12–16)
MCH RBC QN AUTO: 24.5 PG (ref 26–34)
MCHC RBC AUTO-ENTMCNC: 29.7 G/DL (ref 32–36)
MCV RBC AUTO: 83 FL (ref 80–100)
NRBC BLD-RTO: 0 /100 WBCS (ref 0–0)
PLATELET # BLD AUTO: 350 X10*3/UL (ref 150–450)
RBC # BLD AUTO: 4.98 X10*6/UL (ref 4–5.2)
REFLEX ADDED, ANEMIA PANEL: NORMAL
WBC # BLD AUTO: 8.9 X10*3/UL (ref 4.4–11.3)

## 2024-03-14 PROCEDURE — 3078F DIAST BP <80 MM HG: CPT | Performed by: OBSTETRICS & GYNECOLOGY

## 2024-03-14 PROCEDURE — 99395 PREV VISIT EST AGE 18-39: CPT | Performed by: OBSTETRICS & GYNECOLOGY

## 2024-03-14 PROCEDURE — 36415 COLL VENOUS BLD VENIPUNCTURE: CPT

## 2024-03-14 PROCEDURE — 88175 CYTOPATH C/V AUTO FLUID REDO: CPT

## 2024-03-14 PROCEDURE — 1036F TOBACCO NON-USER: CPT | Performed by: OBSTETRICS & GYNECOLOGY

## 2024-03-14 PROCEDURE — 3074F SYST BP LT 130 MM HG: CPT | Performed by: OBSTETRICS & GYNECOLOGY

## 2024-03-14 PROCEDURE — 87624 HPV HI-RISK TYP POOLED RSLT: CPT

## 2024-03-14 PROCEDURE — 88141 CYTOPATH C/V INTERPRET: CPT | Performed by: PATHOLOGY

## 2024-03-14 PROCEDURE — 85027 COMPLETE CBC AUTOMATED: CPT

## 2024-03-14 RX ORDER — NORETHINDRONE 0.35 MG/1
1 TABLET ORAL DAILY
Qty: 28 TABLET | Refills: 11 | Status: SHIPPED | OUTPATIENT
Start: 2024-03-14 | End: 2025-03-14

## 2024-03-14 NOTE — PROGRESS NOTES
"Nadeen Dia is a 33 y.o. here for well woman visit.    HPI: things have been OK. Since having Jason, started having stomach pains. Just had her period, and the pain was excruciating. Has had five periods since she was born and they were all painful.  Sex is fine. First few times, some pain, but now better.  No birth control now. Would like to go on the pill. Used it before.  Diabetes has been good.  Partner is good.  Work is ok, from home, .     GynHx: HPV    Social History     Substance and Sexual Activity   Sexual Activity Yes    Partners: Male    Birth control/protection: None     Current contraception: None  STIs: HPV  Social History:  Exercise:   Past med hx and past surg hx reviewed and notable for: type 2 DM    Objective   /70   Ht 1.651 m (5' 5\")   Wt 93.9 kg (207 lb)   LMP 03/04/2024   BMI 34.45 kg/m²      General:   Alert and oriented, in no acute distress   Neck: Supple. No visible thyromegaly.    Breast/Axilla: Normal to palpation bilaterally without masses, skin changes, or nipple discharge.    Abdomen: Soft, non-tender, without masses or organomegaly   Vulva: Normal architecture without erythema, masses, or lesions.    Vagina: Normal mucosa without lesions, masses, or atrophy. No abnormal vaginal discharge.    Cervix: Normal without masses, lesions, or signs of cervicitis.    Uterus: Normal mobile, non-enlarged uterus    Adnexa: Normal without masses or lesions   Pelvic Floor No POP noted. No high tone pelvic floor    Psych Normal affect. Normal mood.      Assessment and Plan:    Problem List Items Addressed This Visit          Genitourinary and Reproductive    Well woman exam    Current Assessment & Plan     Thank you for your visit for well woman care.  At your visit, you had a pap smear performed. The results will be available in [employer] in two to three weeks.  I ordered a CBC to check for anemia.  I ordered and ultrasound to check for fibroids.  I sent a Rx for the " pill.          Other Visit Diagnoses       Dysmenorrhea    -  Primary    Relevant Orders    US PELVIS TRANSABDOMINAL WITH TRANSVAGINAL    CBC Anemia Panel With Reflex, Pregnancy    Encounter for initial prescription of contraceptive pills               Orders Placed This Encounter   Procedures    US PELVIS TRANSABDOMINAL WITH TRANSVAGINAL     Standing Status:   Future     Standing Expiration Date:   3/14/2025     Order Specific Question:   Is the patient pregnant?     Answer:   No     Order Specific Question:   What is the patient's sedation requirement?     Answer:   No Sedation     Order Specific Question:   Reason for exam:     Answer:   dysmenorrhea     Order Specific Question:   Radiologist to Determine Optimal Study     Answer:   Yes     Order Specific Question:   Release result to MyChart     Answer:   Immediate     Order Specific Question:   Is this exam part of a Research Study? If Yes, link this order to the research study     Answer:   No    CBC Anemia Panel With Reflex, Pregnancy     Standing Status:   Future     Standing Expiration Date:   3/14/2025     Order Specific Question:   Release result to MyChart     Answer:   Immediate [1]

## 2024-03-21 ENCOUNTER — HOSPITAL ENCOUNTER (OUTPATIENT)
Dept: RADIOLOGY | Facility: CLINIC | Age: 34
Discharge: HOME | End: 2024-03-21
Payer: COMMERCIAL

## 2024-03-21 DIAGNOSIS — N94.6 DYSMENORRHEA: ICD-10-CM

## 2024-03-21 PROCEDURE — 76856 US EXAM PELVIC COMPLETE: CPT

## 2024-03-22 ENCOUNTER — TELEPHONE (OUTPATIENT)
Dept: OBSTETRICS AND GYNECOLOGY | Facility: CLINIC | Age: 34
End: 2024-03-22
Payer: COMMERCIAL

## 2024-03-22 NOTE — TELEPHONE ENCOUNTER
Pt contacted.      Pt looking for result of pelvic US.   Pt informed result not available as yet.   Pt will check back with us next week.

## 2024-03-25 ENCOUNTER — TELEPHONE (OUTPATIENT)
Dept: OBSTETRICS AND GYNECOLOGY | Facility: CLINIC | Age: 34
End: 2024-03-25
Payer: COMMERCIAL

## 2024-03-25 ENCOUNTER — TELEPHONE (OUTPATIENT)
Dept: OBSTETRICS AND GYNECOLOGY | Facility: CLINIC | Age: 34
End: 2024-03-25

## 2024-03-25 NOTE — TELEPHONE ENCOUNTER
----- Message from Eliza Marx MD sent at 3/25/2024  9:26 AM EDT -----  Regarding: Call the patient and let her know the results, please.  Call the patient and let her know the results, please.  She has two small uterine fibroids. I cannot say for sure if this is causing her pain.  She can make an appointment if she would like to discuss further.  ----- Message -----  From: Interface, Radiology Results In  Sent: 3/22/2024   9:31 PM EDT  To: Eliza Marx MD    3/25/24 0935 LVMTCB.     3/25/24 0955 Pt is aware of results. She will CB for a follow up appt.

## 2024-03-26 LAB
CYTOLOGY CMNT CVX/VAG CYTO-IMP: NORMAL
HPV HR 12 DNA GENITAL QL NAA+PROBE: POSITIVE
HPV HR GENOTYPES PNL CVX NAA+PROBE: POSITIVE
HPV16 DNA SPEC QL NAA+PROBE: NEGATIVE
HPV18 DNA SPEC QL NAA+PROBE: NEGATIVE
LAB AP HPV GENOTYPE QUESTION: YES
LAB AP HPV HR: NORMAL
LABORATORY COMMENT REPORT: NORMAL
LMP START DATE: NORMAL
PATH REPORT.TOTAL CANCER: NORMAL

## 2024-05-21 DIAGNOSIS — E11.9 TYPE 2 DIABETES MELLITUS WITHOUT COMPLICATION, WITHOUT LONG-TERM CURRENT USE OF INSULIN (MULTI): ICD-10-CM

## 2024-05-21 DIAGNOSIS — I10 BENIGN ESSENTIAL HYPERTENSION: ICD-10-CM

## 2024-05-21 RX ORDER — INSULIN GLARGINE 100 [IU]/ML
18 INJECTION, SOLUTION SUBCUTANEOUS 2 TIMES DAILY
Qty: 15 ML | Refills: 0 | Status: SHIPPED | OUTPATIENT
Start: 2024-05-21

## 2024-05-21 RX ORDER — NIFEDIPINE 60 MG/1
60 TABLET, FILM COATED, EXTENDED RELEASE ORAL DAILY
Qty: 90 TABLET | Refills: 0 | Status: SHIPPED | OUTPATIENT
Start: 2024-05-21

## 2024-08-14 DIAGNOSIS — E11.9 TYPE 2 DIABETES MELLITUS WITHOUT COMPLICATION, WITHOUT LONG-TERM CURRENT USE OF INSULIN (MULTI): ICD-10-CM

## 2024-08-15 RX ORDER — INSULIN GLARGINE 100 [IU]/ML
18 INJECTION, SOLUTION SUBCUTANEOUS 2 TIMES DAILY
Qty: 9 ML | Refills: 0 | Status: SHIPPED | OUTPATIENT
Start: 2024-08-15

## 2024-08-26 ENCOUNTER — OFFICE VISIT (OUTPATIENT)
Dept: PRIMARY CARE | Facility: HOSPITAL | Age: 34
End: 2024-08-26
Payer: COMMERCIAL

## 2024-08-26 ENCOUNTER — LAB (OUTPATIENT)
Dept: LAB | Facility: LAB | Age: 34
End: 2024-08-26
Payer: COMMERCIAL

## 2024-08-26 VITALS
RESPIRATION RATE: 17 BRPM | BODY MASS INDEX: 32.99 KG/M2 | HEART RATE: 100 BPM | SYSTOLIC BLOOD PRESSURE: 146 MMHG | OXYGEN SATURATION: 98 % | HEIGHT: 65 IN | DIASTOLIC BLOOD PRESSURE: 106 MMHG | WEIGHT: 198 LBS

## 2024-08-26 DIAGNOSIS — E55.9 HYPOVITAMINOSIS D: ICD-10-CM

## 2024-08-26 DIAGNOSIS — E11.9 TYPE 2 DIABETES MELLITUS WITHOUT COMPLICATION, WITHOUT LONG-TERM CURRENT USE OF INSULIN (MULTI): ICD-10-CM

## 2024-08-26 DIAGNOSIS — J45.40 MODERATE PERSISTENT ASTHMA WITHOUT COMPLICATION (HHS-HCC): ICD-10-CM

## 2024-08-26 DIAGNOSIS — I10 BENIGN ESSENTIAL HYPERTENSION: Primary | ICD-10-CM

## 2024-08-26 LAB
25(OH)D3 SERPL-MCNC: 10 NG/ML (ref 30–100)
ALBUMIN SERPL BCP-MCNC: 4.2 G/DL (ref 3.4–5)
ALP SERPL-CCNC: 141 U/L (ref 33–110)
ALT SERPL W P-5'-P-CCNC: 32 U/L (ref 7–45)
ANION GAP SERPL CALC-SCNC: 13 MMOL/L (ref 10–20)
AST SERPL W P-5'-P-CCNC: 19 U/L (ref 9–39)
BILIRUB SERPL-MCNC: 0.4 MG/DL (ref 0–1.2)
BUN SERPL-MCNC: 11 MG/DL (ref 6–23)
CALCIUM SERPL-MCNC: 9.6 MG/DL (ref 8.6–10.3)
CHLORIDE SERPL-SCNC: 102 MMOL/L (ref 98–107)
CHOLEST SERPL-MCNC: 279 MG/DL (ref 0–199)
CHOLESTEROL/HDL RATIO: 5
CO2 SERPL-SCNC: 25 MMOL/L (ref 21–32)
CREAT SERPL-MCNC: 0.67 MG/DL (ref 0.5–1.05)
EGFRCR SERPLBLD CKD-EPI 2021: >90 ML/MIN/1.73M*2
EST. AVERAGE GLUCOSE BLD GHB EST-MCNC: 341 MG/DL
GLUCOSE SERPL-MCNC: 154 MG/DL (ref 74–99)
HBA1C MFR BLD: 13.5 %
HDLC SERPL-MCNC: 55.3 MG/DL
NON-HDL CHOLESTEROL: 224 MG/DL (ref 0–149)
POTASSIUM SERPL-SCNC: 3.8 MMOL/L (ref 3.5–5.3)
PROT SERPL-MCNC: 6.8 G/DL (ref 6.4–8.2)
SODIUM SERPL-SCNC: 136 MMOL/L (ref 136–145)

## 2024-08-26 PROCEDURE — 3008F BODY MASS INDEX DOCD: CPT | Performed by: INTERNAL MEDICINE

## 2024-08-26 PROCEDURE — 80053 COMPREHEN METABOLIC PANEL: CPT

## 2024-08-26 PROCEDURE — 83718 ASSAY OF LIPOPROTEIN: CPT

## 2024-08-26 PROCEDURE — 99214 OFFICE O/P EST MOD 30 MIN: CPT | Performed by: INTERNAL MEDICINE

## 2024-08-26 PROCEDURE — 82570 ASSAY OF URINE CREATININE: CPT

## 2024-08-26 PROCEDURE — 1036F TOBACCO NON-USER: CPT | Performed by: INTERNAL MEDICINE

## 2024-08-26 PROCEDURE — 3077F SYST BP >= 140 MM HG: CPT | Performed by: INTERNAL MEDICINE

## 2024-08-26 PROCEDURE — 83036 HEMOGLOBIN GLYCOSYLATED A1C: CPT

## 2024-08-26 PROCEDURE — 82306 VITAMIN D 25 HYDROXY: CPT

## 2024-08-26 PROCEDURE — 82043 UR ALBUMIN QUANTITATIVE: CPT

## 2024-08-26 PROCEDURE — 3080F DIAST BP >= 90 MM HG: CPT | Performed by: INTERNAL MEDICINE

## 2024-08-26 PROCEDURE — 82465 ASSAY BLD/SERUM CHOLESTEROL: CPT

## 2024-08-26 PROCEDURE — 36415 COLL VENOUS BLD VENIPUNCTURE: CPT

## 2024-08-26 RX ORDER — AMLODIPINE BESYLATE 5 MG/1
5 TABLET ORAL DAILY
Qty: 90 TABLET | Refills: 3 | Status: SHIPPED | OUTPATIENT
Start: 2024-08-26 | End: 2025-08-26

## 2024-08-26 RX ORDER — INSULIN GLARGINE 100 [IU]/ML
18 INJECTION, SOLUTION SUBCUTANEOUS 2 TIMES DAILY
Qty: 32.4 ML | Refills: 3 | Status: SHIPPED | OUTPATIENT
Start: 2024-08-26 | End: 2025-08-26

## 2024-08-26 ASSESSMENT — ENCOUNTER SYMPTOMS
SHORTNESS OF BREATH: 0
DIARRHEA: 0
NAUSEA: 0
FREQUENCY: 1
WEAKNESS: 0
PALPITATIONS: 0
CHEST TIGHTNESS: 0
ACTIVITY CHANGE: 0
AGITATION: 0
MYALGIAS: 0
CHILLS: 0
SLEEP DISTURBANCE: 0

## 2024-08-26 NOTE — PROGRESS NOTES
"Subjective   Patient ID: Nadeen Dia is a 33 y.o. female who presents for New Patient Visit (New patient-est. Care/Refill BP and lantus medications).  The patient has a history of asthma but has not had any recent trouble with it.  She keeps a inhaler handy.  Her blood pressure has been elevated in the past and she struggles with remembering to take her medicine as she should.    HPI     Review of Systems   Constitutional:  Negative for activity change and chills.   HENT:  Negative for congestion.    Respiratory:  Negative for chest tightness and shortness of breath.    Cardiovascular:  Negative for chest pain and palpitations.   Gastrointestinal:  Negative for diarrhea and nausea.   Genitourinary:  Positive for frequency.   Musculoskeletal:  Negative for myalgias.   Neurological:  Negative for weakness.   Psychiatric/Behavioral:  Negative for agitation, behavioral problems and sleep disturbance.        Objective   BP (!) 146/106 (BP Location: Left arm, Patient Position: Sitting)   Pulse 100   Resp 17   Ht 1.651 m (5' 5\")   Wt 89.8 kg (198 lb)   SpO2 98%   BMI 32.95 kg/m²     Physical Exam  Constitutional:       Appearance: Normal appearance.   HENT:      Head: Normocephalic and atraumatic.      Nose: Nose normal.      Mouth/Throat:      Mouth: Mucous membranes are moist.   Eyes:      Extraocular Movements: Extraocular movements intact.      Pupils: Pupils are equal, round, and reactive to light.   Cardiovascular:      Rate and Rhythm: Normal rate and regular rhythm.   Pulmonary:      Effort: No respiratory distress.      Breath sounds: No wheezing.   Abdominal:      General: Bowel sounds are normal.      Palpations: Abdomen is soft.   Neurological:      General: No focal deficit present.       Assessment/Plan   Problem List Items Addressed This Visit             ICD-10-CM    Benign essential hypertension - Primary I10     Other Visit Diagnoses         Codes    Controlled type 2 diabetes mellitus without " complication, with long-term current use of insulin (Multi)     E11.9, Z79.4        We talked about importance of having a scheduled for taking her medicine as well as a pill dispenser.  She will attempt to be more vigilant with this.  We will stop the nifedipine and start amlodipine and she will take it more regularly.  Surprisingly she has done well on Lantus but has not tried any of the other oral medications available outside of metformin.  We will see what her hemoglobin A1c is and make a decision then.

## 2024-08-27 LAB
CREAT UR-MCNC: 631.3 MG/DL (ref 20–320)
MICROALBUMIN UR-MCNC: 70.8 MG/L
MICROALBUMIN/CREAT UR: 11.2 UG/MG CREAT

## 2024-08-28 ENCOUNTER — TELEPHONE (OUTPATIENT)
Dept: PRIMARY CARE | Facility: CLINIC | Age: 34
End: 2024-08-28
Payer: COMMERCIAL

## 2024-08-28 NOTE — TELEPHONE ENCOUNTER
Result Communication    Resulted Orders   Comprehensive Metabolic Panel   Result Value Ref Range    Glucose 154 (H) 74 - 99 mg/dL    Sodium 136 136 - 145 mmol/L    Potassium 3.8 3.5 - 5.3 mmol/L    Chloride 102 98 - 107 mmol/L    Bicarbonate 25 21 - 32 mmol/L    Anion Gap 13 10 - 20 mmol/L    Urea Nitrogen 11 6 - 23 mg/dL    Creatinine 0.67 0.50 - 1.05 mg/dL    eGFR >90 >60 mL/min/1.73m*2      Comment:      Calculations of estimated GFR are performed using the 2021 CKD-EPI Study Refit equation without the race variable for the IDMS-Traceable creatinine methods.  https://jasn.asnjournals.org/content/early/2021/09/22/ASN.7024102655    Calcium 9.6 8.6 - 10.3 mg/dL    Albumin 4.2 3.4 - 5.0 g/dL    Alkaline Phosphatase 141 (H) 33 - 110 U/L    Total Protein 6.8 6.4 - 8.2 g/dL    AST 19 9 - 39 U/L    Bilirubin, Total 0.4 0.0 - 1.2 mg/dL    ALT 32 7 - 45 U/L      Comment:      Patients treated with Sulfasalazine may generate falsely decreased results for ALT.   Hemoglobin A1C   Result Value Ref Range    Hemoglobin A1C 13.5 (H) see below %    Estimated Average Glucose 341 Not Established mg/dL    Narrative    Diagnosis of Diabetes-Adults  Non-Diabetic: < or = 5.6%  Increased risk for developing diabetes: 5.7-6.4%  Diagnostic of diabetes: > or = 6.5%       Albumin-Creatinine Ratio, Urine Random   Result Value Ref Range    Albumin, Urine Random 70.8 Not established mg/L    Creatinine, Urine Random 631.3 (H) 20.0 - 320.0 mg/dL    Albumin/Creatinine Ratio 11.2 <30.0 ug/mg Creat   Vitamin D 25-Hydroxy,Total (for eval of Vitamin D levels)   Result Value Ref Range    Vitamin D, 25-Hydroxy, Total 10 (L) 30 - 100 ng/mL    Narrative    Deficiency:         < 20   ng/ml  Insufficiency:      20-29  ng/ml  Sufficiency:         ng/ml  This assay accurately quantifies the sum of Vitamin D3, 25-Hydroxy and Vitamin D2,25-Hydroxy.   Lipid Panel Non-Fasting   Result Value Ref Range    Cholesterol 279 (H) 0 - 199 mg/dL      Comment:             Age      Desirable   Borderline High   High     0-19 Y     0 - 169       170 - 199     >/= 200    20-24 Y     0 - 189       190 - 224     >/= 225         >24 Y     0 - 199       200 - 239     >/= 240   **All ranges are based on fasting samples. Specific   therapeutic targets will vary based on patient-specific   cardiac risk.    Pediatric guidelines reference:Pediatrics 2011, 128(S5).Adult guidelines reference: NCEP ATPIII Guidelines,SUSIE 2001, 258:2486-97    Venipuncture immediately after or during the administration of Metamizole may lead to falsely low results. Testing should be performed immediately prior to Metamizole dosing.    HDL-Cholesterol 55.3 mg/dL      Comment:        Age       Very Low   Low     Normal    High    0-19 Y    < 35      < 40     40-45     ----  20-24 Y    ----     < 40      >45      ----        >24 Y      ----     < 40     40-60      >60      Cholesterol/HDL Ratio 5.0       Comment:        Ref Values  Desirable  < 3.4  High Risk  > 5.0    Non-HDL Cholesterol 224 (H) 0 - 149 mg/dL      Comment:         Age        Desiable   Borderline High   High     Very High   0-19 Y     0 - 119       120 - 144     >/= 145    >/= 160  20-24 Y     0 - 149       150 - 189     >/= 190      ----       >24 Y    30 MG/DL ABOVE LDL CHOLESTEROL GOAL       6:23 PM      Results were successfully communicated with the patient and they acknowledged their understanding. She is planning to try to have another child in the next year.  I will hold back on a statin but she will dramatically alter her diet.  She will also increase exercise.

## 2024-12-13 ENCOUNTER — LAB (OUTPATIENT)
Dept: LAB | Facility: LAB | Age: 34
End: 2024-12-13
Payer: COMMERCIAL

## 2024-12-13 ENCOUNTER — APPOINTMENT (OUTPATIENT)
Dept: ENDOCRINOLOGY | Facility: CLINIC | Age: 34
End: 2024-12-13
Payer: COMMERCIAL

## 2024-12-13 VITALS
WEIGHT: 197 LBS | DIASTOLIC BLOOD PRESSURE: 100 MMHG | HEIGHT: 65 IN | SYSTOLIC BLOOD PRESSURE: 156 MMHG | HEART RATE: 101 BPM | BODY MASS INDEX: 32.82 KG/M2

## 2024-12-13 DIAGNOSIS — R68.89 COLD INTOLERANCE: ICD-10-CM

## 2024-12-13 DIAGNOSIS — R68.89 COLD INTOLERANCE: Primary | ICD-10-CM

## 2024-12-13 LAB — TSH SERPL-ACNC: 2.47 MIU/L (ref 0.44–3.98)

## 2024-12-13 PROCEDURE — 3080F DIAST BP >= 90 MM HG: CPT | Performed by: STUDENT IN AN ORGANIZED HEALTH CARE EDUCATION/TRAINING PROGRAM

## 2024-12-13 PROCEDURE — 3008F BODY MASS INDEX DOCD: CPT | Performed by: STUDENT IN AN ORGANIZED HEALTH CARE EDUCATION/TRAINING PROGRAM

## 2024-12-13 PROCEDURE — 3077F SYST BP >= 140 MM HG: CPT | Performed by: STUDENT IN AN ORGANIZED HEALTH CARE EDUCATION/TRAINING PROGRAM

## 2024-12-13 PROCEDURE — 99204 OFFICE O/P NEW MOD 45 MIN: CPT | Performed by: STUDENT IN AN ORGANIZED HEALTH CARE EDUCATION/TRAINING PROGRAM

## 2024-12-13 PROCEDURE — 84443 ASSAY THYROID STIM HORMONE: CPT

## 2024-12-13 PROCEDURE — 36415 COLL VENOUS BLD VENIPUNCTURE: CPT

## 2024-12-13 ASSESSMENT — PAIN SCALES - GENERAL: PAINLEVEL_OUTOF10: 0-NO PAIN

## 2024-12-13 NOTE — PROGRESS NOTES
Endocrinology  12/13/2024    History of Present Illness   Nadeen Dia is a 34 y.o. year old female with medical history of T2D, HTN, here for possible thyroid issues.     Patient is 1 year postpartum.   This was her 3rd baby.   Since delivery, she has not felt well.   Developed [place] Palsy 5 days postpartum. That has mostly resolved.     She recently developed neck pain.  This started in the back of her neck/shoulders. She went to the ED for this. Treated with muscle relaxer.   Now she has pain mostly in the front of her neck. Pain comes and goes, radiates to her shoulder areas.   This has been going on for 2 months.   Possibly had a cold around the time this started.   Denies new neck lumps, bumps.   Denies difficulty swallowing, breathing, or change in voice.     Weight stable   Some palpitations   Denies tremor   Denies difficulty sleeping   +cold intolerance   +constipation since delivery of 3rd baby     No personal history of thyroid disease     Family history of thyroid disease: Yes - cousins and grandmother    History of radiation to the head/neck/chest: Denies    Amiodarone use: Denies    Biotin use: Denies      She is planning on seeing her PCP about her BP and DM Jan 6.   She prefers to discuss these things with him at this time.     Medications     Current Outpatient Medications   Medication Instructions    amLODIPine (NORVASC) 5 mg, oral, Daily    benzalkonium chloride 0.13 % towelette Take B/P every day as instructed.    blood sugar diagnostic (Blood Glucose Test) strip CHECK BLOOD GLUCOSE DOROTEO DAILY, ONCE IN THE MORNING AFTER GETTING UP ( BEFORE EATING ANYTHING) AND AGAIN 2 HRS AFTER DINNER.    FreeStyle Lancets 28 gauge     Lantus Solostar U-100 Insulin 18 Units, subcutaneous, 2 times daily, Take as directed per insulin instructions.    norethindrone (MICRONOR) 0.35 mg, oral, Daily          History      Past Medical History:   Diagnosis Date    15 weeks gestation of pregnancy ([facility]) 01/22/2021     15 weeks gestation of pregnancy    19 weeks gestation of pregnancy ([facility]) 01/22/2021    19 weeks gestation of pregnancy    23 weeks gestation of pregnancy ([facility]) 01/22/2021    23 weeks gestation of pregnancy    Cramp and spasm     Leg cramps    Encounter for supervision of other normal pregnancy, unspecified trimester ([facility]) 01/22/2021    Prenatal care, subsequent pregnancy    Encounter for surveillance of injectable contraceptive 04/08/2015    Surveillance for Depo-Provera contraception    Exercise induced bronchospasm ([facility])     Exercise-induced asthma    Gestational (pregnancy-induced) hypertension without significant proteinuria, unspecified trimester ([facility]) 09/24/2014    Gestational hypertension    High grade squamous intraepithelial lesion on cytologic smear of cervix ([facility]) 12/03/2015    HSIL (high grade squamous intraepithelial lesion) on Pap smear of cervix    Maternal care for other (suspected) fetal abnormality and damage, fetal cardiac anomalies, not applicable or unspecified ([facility]) 01/22/2021    Abnormal fetal echocardiogram affecting antepartum care of mother    Other specified diseases and conditions complicating pregnancy ([facility]) 09/24/2014    Back pain complicating pregnancy    Other specified pregnancy related conditions, unspecified trimester ([facility]) 01/22/2021    Rh negative, antepartum    Personal history of gestational diabetes 01/22/2021    History of gestational diabetes mellitus (GDM)    Personal history of other diseases of the musculoskeletal system and connective tissue     History of low back pain    Personal history of other endocrine, nutritional and metabolic disease 10/16/2020    History of type 2 diabetes mellitus    Pre-existing type 2 diabetes mellitus, in pregnancy, first trimester ([facility]) 01/22/2021    Pregnancy with type 2 diabetes mellitus in first trimester    Pre-existing type 2 diabetes mellitus, in pregnancy, second trimester ([facility])  "01/22/2021    Pregnancy with type 2 diabetes mellitus in second trimester    Pre-existing type 2 diabetes mellitus, in pregnancy, third trimester ([facility]) 08/09/2020    Pregnancy with type 2 diabetes mellitus in third trimester    Pre-existing type 2 diabetes mellitus, in pregnancy, unspecified trimester ([facility]) 01/22/2021    Pre-existing type 2 diabetes mellitus during pregnancy, antepartum    Pregnancy with inconclusive fetal viability, not applicable or unspecified ([facility]) 01/22/2021    Pregnancy of unknown anatomic location    Type O blood, Rh negative     Blood type O-    Unspecified maternal hypertension, third trimester ([facility]) 01/22/2021    Elevated blood pressure complicating pregnancy, antepartum, third trimester    Unspecified pre-existing hypertension complicating pregnancy, second trimester ([facility]) 01/22/2021    Maternal chronic hypertension in second trimester    Unspecified pre-existing hypertension complicating pregnancy, unspecified trimester ([facility]) 01/22/2021    Chronic hypertension in pregnancy       No past surgical history on file.    Family History   Problem Relation Name Age of Onset    Asthma Mother      Other (Cerebral infarction) Mother      Heart attack Father      Diabetes Maternal Grandmother      Lung cancer Maternal Grandmother          Allergies   Allergen Reactions    Bee Venom Protein (Honey Bee) Rash        Social history:   - Denies alcohol use   - Denies recreational drug use   - Denies tobacco use   - Works for [employer]   - Lives with  and 3 daughters - 10, 4, 1      Physical Exam   BP (!) 156/100 (BP Location: Right arm, Patient Position: Sitting, BP Cuff Size: Adult)   Pulse 101   Ht 1.651 m (5' 5\")   Wt 89.4 kg (197 lb)   BMI 32.78 kg/m²   General: Well appearing, no acute distress  Heart: Normal rate  Neck: Soft, nontender, no lymphadenopathy, thyroid mildly enlarged but no palpable nodules   Lungs: Breathing comfortably on room air   Extremities: " Warm, no edema, no tremor   Skin: No rashes    Labs and Imaging     Lab Results   Component Value Date    TSH 1.15 12/20/2023    FREET4 0.82 01/24/2023     Component      Latest Ref Rng 8/26/2024   Hemoglobin A1C      see below % 13.5 (H)    Estimated Average Glucose      Not Established mg/dL 341         Assessment and Plan   Nadeen Dia is a 34 y.o. year old female with medical history of T2D, HTN, here for possible thyroid issues. The neck pain she describes is not typical for a thyroid etiology. It is possible she had thyroiditis, either postpartum or subacute, but postpartum is typically painless and subacute is usually painful to palpation and I would not expect the pain to come and go or persist this long. Will check TSH to evaluate her thyroid function, but this has been normal in the past.     Lastly, briefly discussed her diabetes. Her most recent A1c is 13.5%. At this point she prefers to follow up as planned with her PCP, but I offered to discuss her DM today and/or see her back for DM should she wish in the future. We did discuss the teratogenicity of uncontrolled blood glucose since she would like to get pregnant again.     # Possible thyroid dysfunction:   - TSH with reflex to FT4     RTC: Pending labs       Skylar Lawler DO   Endocrinology

## 2025-04-08 ENCOUNTER — APPOINTMENT (OUTPATIENT)
Dept: ENDOCRINOLOGY | Facility: CLINIC | Age: 35
End: 2025-04-08
Payer: COMMERCIAL

## 2025-04-09 ENCOUNTER — APPOINTMENT (OUTPATIENT)
Dept: ENDOCRINOLOGY | Facility: CLINIC | Age: 35
End: 2025-04-09
Payer: COMMERCIAL

## 2025-09-18 ENCOUNTER — APPOINTMENT (OUTPATIENT)
Dept: OBSTETRICS AND GYNECOLOGY | Facility: CLINIC | Age: 35
End: 2025-09-18
Payer: COMMERCIAL

## 2026-01-05 ENCOUNTER — APPOINTMENT (OUTPATIENT)
Dept: PRIMARY CARE | Facility: HOSPITAL | Age: 36
End: 2026-01-05
Payer: COMMERCIAL